# Patient Record
Sex: FEMALE | Race: WHITE | NOT HISPANIC OR LATINO | Employment: FULL TIME | URBAN - METROPOLITAN AREA
[De-identification: names, ages, dates, MRNs, and addresses within clinical notes are randomized per-mention and may not be internally consistent; named-entity substitution may affect disease eponyms.]

---

## 2018-04-25 ENCOUNTER — HISTORICAL (OUTPATIENT)
Dept: ADMINISTRATIVE | Facility: HOSPITAL | Age: 33
End: 2018-04-25

## 2018-04-25 LAB
ABS NEUT (OLG): 4.56 X10(3)/MCL (ref 2.1–9.2)
ALBUMIN SERPL-MCNC: 4 GM/DL (ref 3.4–5)
ALBUMIN/GLOB SERPL: 1 RATIO (ref 1–2)
ALP SERPL-CCNC: 42 UNIT/L (ref 45–117)
ALT SERPL-CCNC: 18 UNIT/L (ref 12–78)
APPEARANCE, UA: CLEAR
AST SERPL-CCNC: 13 UNIT/L (ref 15–37)
BACTERIA #/AREA URNS AUTO: ABNORMAL /[HPF]
BASOPHILS # BLD AUTO: 0.07 X10(3)/MCL
BASOPHILS NFR BLD AUTO: 1 %
BILIRUB SERPL-MCNC: 0.7 MG/DL (ref 0.2–1)
BILIRUB UR QL STRIP: NEGATIVE
BILIRUBIN DIRECT+TOT PNL SERPL-MCNC: 0.2 MG/DL
BILIRUBIN DIRECT+TOT PNL SERPL-MCNC: 0.5 MG/DL
BUN SERPL-MCNC: 17 MG/DL (ref 7–18)
CALCIUM SERPL-MCNC: 8.6 MG/DL (ref 8.5–10.1)
CHLORIDE SERPL-SCNC: 108 MMOL/L (ref 98–107)
CO2 SERPL-SCNC: 27 MMOL/L (ref 21–32)
COLOR UR: NORMAL
CREAT SERPL-MCNC: 0.9 MG/DL (ref 0.6–1.3)
EOSINOPHIL # BLD AUTO: 0.1 X10(3)/MCL
EOSINOPHIL NFR BLD AUTO: 1 %
ERYTHROCYTE [DISTWIDTH] IN BLOOD BY AUTOMATED COUNT: 13.3 % (ref 11.5–14.5)
GLOBULIN SER-MCNC: 3.4 GM/ML (ref 2.3–3.5)
GLUCOSE (UA): NORMAL
GLUCOSE SERPL-MCNC: 65 MG/DL (ref 74–106)
HCT VFR BLD AUTO: 41.3 % (ref 35–46)
HGB BLD-MCNC: 13.6 GM/DL (ref 12–16)
HGB UR QL STRIP: NEGATIVE
HYALINE CASTS #/AREA URNS LPF: ABNORMAL /[LPF]
IMM GRANULOCYTES # BLD AUTO: 0.02 10*3/UL
IMM GRANULOCYTES NFR BLD AUTO: 0 %
KETONES UR QL STRIP: NEGATIVE
LEUKOCYTE ESTERASE UR QL STRIP: NEGATIVE
LYMPHOCYTES # BLD AUTO: 1.99 X10(3)/MCL
LYMPHOCYTES NFR BLD AUTO: 27 % (ref 13–40)
MCH RBC QN AUTO: 29.9 PG (ref 26–34)
MCHC RBC AUTO-ENTMCNC: 32.9 GM/DL (ref 31–37)
MCV RBC AUTO: 90.8 FL (ref 80–100)
MONOCYTES # BLD AUTO: 0.51 X10(3)/MCL
MONOCYTES NFR BLD AUTO: 7 % (ref 4–12)
NEUTROPHILS # BLD AUTO: 4.56 X10(3)/MCL
NEUTROPHILS NFR BLD AUTO: 63 X10(3)/MCL
NITRITE UR QL STRIP: NEGATIVE
PH UR STRIP: 6 [PH] (ref 4.5–8)
PLATELET # BLD AUTO: 241 X10(3)/MCL (ref 130–400)
PMV BLD AUTO: 10.6 FL (ref 7.4–10.4)
POTASSIUM SERPL-SCNC: 4.2 MMOL/L (ref 3.5–5.1)
PROT SERPL-MCNC: 7.4 GM/DL (ref 6.4–8.2)
PROT UR QL STRIP: NEGATIVE
RBC # BLD AUTO: 4.55 X10(6)/MCL (ref 4–5.2)
RBC #/AREA URNS AUTO: ABNORMAL /[HPF]
SODIUM SERPL-SCNC: 141 MMOL/L (ref 136–145)
SP GR UR STRIP: 1.01 (ref 1–1.03)
SQUAMOUS #/AREA URNS LPF: ABNORMAL /[LPF]
UROBILINOGEN UR STRIP-ACNC: NORMAL
WBC # SPEC AUTO: 7.2 X10(3)/MCL (ref 4.5–11)
WBC #/AREA URNS AUTO: ABNORMAL /HPF

## 2019-01-16 ENCOUNTER — HISTORICAL (OUTPATIENT)
Dept: ADMINISTRATIVE | Facility: HOSPITAL | Age: 34
End: 2019-01-16

## 2019-01-16 LAB
HAV IGM SERPL QL IA: NONREACTIVE
HBV CORE IGM SERPL QL IA: NONREACTIVE
HBV SURFACE AG SERPL QL IA: NEGATIVE
HCV AB SERPL QL IA: NONREACTIVE
HIV 1+2 AB+HIV1 P24 AG SERPL QL IA: NONREACTIVE
T PALLIDUM AB SER QL: NONREACTIVE

## 2019-04-30 ENCOUNTER — HISTORICAL (OUTPATIENT)
Dept: INTERNAL MEDICINE | Facility: CLINIC | Age: 34
End: 2019-04-30

## 2019-04-30 LAB
ABS NEUT (OLG): 2.79 X10(3)/MCL (ref 2.1–9.2)
ALBUMIN SERPL-MCNC: 3.8 GM/DL (ref 3.4–5)
ALBUMIN/GLOB SERPL: 1.1 RATIO (ref 1.1–2)
ALP SERPL-CCNC: 35 UNIT/L (ref 45–117)
ALT SERPL-CCNC: 13 UNIT/L (ref 12–78)
AST SERPL-CCNC: 12 UNIT/L (ref 15–37)
BASOPHILS # BLD AUTO: 0.06 X10(3)/MCL
BASOPHILS NFR BLD AUTO: 1 %
BILIRUB SERPL-MCNC: 0.8 MG/DL (ref 0.2–1)
BILIRUBIN DIRECT+TOT PNL SERPL-MCNC: 0.2 MG/DL
BILIRUBIN DIRECT+TOT PNL SERPL-MCNC: 0.6 MG/DL
BUN SERPL-MCNC: 14 MG/DL (ref 7–18)
CALCIUM SERPL-MCNC: 8.5 MG/DL (ref 8.5–10.1)
CHLORIDE SERPL-SCNC: 109 MMOL/L (ref 98–107)
CHOLEST SERPL-MCNC: 152 MG/DL
CHOLEST/HDLC SERPL: 2.5 {RATIO} (ref 0–4.4)
CO2 SERPL-SCNC: 30 MMOL/L (ref 21–32)
CREAT SERPL-MCNC: 0.9 MG/DL (ref 0.6–1.3)
EOSINOPHIL # BLD AUTO: 0.15 10*3/UL
EOSINOPHIL NFR BLD AUTO: 2 %
ERYTHROCYTE [DISTWIDTH] IN BLOOD BY AUTOMATED COUNT: 13 % (ref 11.5–14.5)
GLOBULIN SER-MCNC: 3.6 GM/ML (ref 2.3–3.5)
GLUCOSE SERPL-MCNC: 88 MG/DL (ref 74–106)
HCT VFR BLD AUTO: 41.3 % (ref 35–46)
HDLC SERPL-MCNC: 62 MG/DL
HGB BLD-MCNC: 13.3 GM/DL (ref 12–16)
IMM GRANULOCYTES # BLD AUTO: 0.02 10*3/UL
IMM GRANULOCYTES NFR BLD AUTO: 0 %
LDLC SERPL CALC-MCNC: 75 MG/DL (ref 0–130)
LYMPHOCYTES # BLD AUTO: 2.96 X10(3)/MCL
LYMPHOCYTES NFR BLD AUTO: 46 % (ref 13–40)
MCH RBC QN AUTO: 29.4 PG (ref 26–34)
MCHC RBC AUTO-ENTMCNC: 32.2 GM/DL (ref 31–37)
MCV RBC AUTO: 91.4 FL (ref 80–100)
MONOCYTES # BLD AUTO: 0.43 X10(3)/MCL
MONOCYTES NFR BLD AUTO: 7 % (ref 4–12)
NEUTROPHILS # BLD AUTO: 2.79 X10(3)/MCL
NEUTROPHILS NFR BLD AUTO: 44 X10(3)/MCL
PLATELET # BLD AUTO: 239 X10(3)/MCL (ref 130–400)
PMV BLD AUTO: 10.7 FL (ref 7.4–10.4)
POTASSIUM SERPL-SCNC: 4.1 MMOL/L (ref 3.5–5.1)
PROT SERPL-MCNC: 7.4 GM/DL (ref 6.4–8.2)
RBC # BLD AUTO: 4.52 X10(6)/MCL (ref 4–5.2)
SODIUM SERPL-SCNC: 142 MMOL/L (ref 136–145)
TRIGL SERPL-MCNC: 73 MG/DL
VLDLC SERPL CALC-MCNC: 15 MG/DL
WBC # SPEC AUTO: 6.4 X10(3)/MCL (ref 4.5–11)

## 2021-08-16 LAB
PAP RECOMMENDATION EXT: NORMAL
PAP RECOMMENDATION EXT: NORMAL
PAP SMEAR: NORMAL
PAP SMEAR: NORMAL

## 2022-04-13 ENCOUNTER — HISTORICAL (OUTPATIENT)
Dept: ADMINISTRATIVE | Facility: HOSPITAL | Age: 37
End: 2022-04-13

## 2022-04-13 LAB
CRP SERPL HS-MCNC: <0.03 MG/L
ERYTHROCYTE [SEDIMENTATION RATE] IN BLOOD: 2 MM/H (ref 0–20)
HEMOLYSIS INTERF INDEX SERPL-ACNC: 8
MAGNESIUM SERPL-MCNC: 2.3 MG/DL (ref 1.6–2.6)

## 2022-04-20 ENCOUNTER — HISTORICAL (OUTPATIENT)
Dept: ADMINISTRATIVE | Facility: HOSPITAL | Age: 37
End: 2022-04-20
Payer: COMMERCIAL

## 2022-04-30 NOTE — PROGRESS NOTES
Patient:   Teodora Santos            MRN: [id number]            FIN: [id number]               Age:   34 years     Sex:  Female     :  1985   Associated Diagnoses:   None   Author:   Jhoan WATTS, Madeleine Rogers reviewed: Will discuss with patient during follow up appointment on  May 2, 2019 at 8:40am.

## 2023-01-05 ENCOUNTER — DOCUMENTATION ONLY (OUTPATIENT)
Dept: INTERNAL MEDICINE | Facility: CLINIC | Age: 38
End: 2023-01-05

## 2023-01-31 ENCOUNTER — TELEPHONE (OUTPATIENT)
Dept: NEUROLOGY | Facility: CLINIC | Age: 38
End: 2023-01-31

## 2023-01-31 NOTE — TELEPHONE ENCOUNTER
Patient called requesting if we can send a new prescription for Nerivio medication. States she spoke with [facility] and was advised the prescription was . Please advise.

## 2023-01-31 NOTE — TELEPHONE ENCOUNTER
Pt was last seen 1/18/2022 and does not currently have a follow up. She would need an appointment to be revaluated to be able to send order for Rx

## 2023-02-03 NOTE — TELEPHONE ENCOUNTER
Spoke with patient, states she had a changed in her insurance. States she now has [employer] medicaid insurance. Is it okay to schedule patient a f/u appt then refer pt out afterwards? Please advise.

## 2023-02-03 NOTE — TELEPHONE ENCOUNTER
We would still need to provider care for her until she is able to be seen by possibly [facility] would need to check when next opening is for a transfer and if that time frame is okay for her

## 2023-02-23 ENCOUNTER — OFFICE VISIT (OUTPATIENT)
Dept: NEUROLOGY | Facility: CLINIC | Age: 38
End: 2023-02-23
Payer: MEDICAID

## 2023-02-23 VITALS
HEART RATE: 72 BPM | DIASTOLIC BLOOD PRESSURE: 66 MMHG | WEIGHT: 154 LBS | BODY MASS INDEX: 23.34 KG/M2 | SYSTOLIC BLOOD PRESSURE: 98 MMHG | HEIGHT: 68 IN

## 2023-02-23 DIAGNOSIS — G44.209 TENSION HEADACHE: ICD-10-CM

## 2023-02-23 DIAGNOSIS — G43.909 MIGRAINE WITHOUT STATUS MIGRAINOSUS, NOT INTRACTABLE, UNSPECIFIED MIGRAINE TYPE: Primary | ICD-10-CM

## 2023-02-23 PROCEDURE — 3078F PR MOST RECENT DIASTOLIC BLOOD PRESSURE < 80 MM HG: ICD-10-PCS | Mod: CPTII,,, | Performed by: NURSE PRACTITIONER

## 2023-02-23 PROCEDURE — 3008F PR BODY MASS INDEX (BMI) DOCUMENTED: ICD-10-PCS | Mod: CPTII,,, | Performed by: NURSE PRACTITIONER

## 2023-02-23 PROCEDURE — 1160F PR REVIEW ALL MEDS BY PRESCRIBER/CLIN PHARMACIST DOCUMENTED: ICD-10-PCS | Mod: CPTII,,, | Performed by: NURSE PRACTITIONER

## 2023-02-23 PROCEDURE — 99214 OFFICE O/P EST MOD 30 MIN: CPT | Mod: S$PBB,,, | Performed by: NURSE PRACTITIONER

## 2023-02-23 PROCEDURE — 3078F DIAST BP <80 MM HG: CPT | Mod: CPTII,,, | Performed by: NURSE PRACTITIONER

## 2023-02-23 PROCEDURE — 3074F SYST BP LT 130 MM HG: CPT | Mod: CPTII,,, | Performed by: NURSE PRACTITIONER

## 2023-02-23 PROCEDURE — 3008F BODY MASS INDEX DOCD: CPT | Mod: CPTII,,, | Performed by: NURSE PRACTITIONER

## 2023-02-23 PROCEDURE — 99999 PR PBB SHADOW E&M-EST. PATIENT-LVL IV: ICD-10-PCS | Mod: PBBFAC,,, | Performed by: NURSE PRACTITIONER

## 2023-02-23 PROCEDURE — 1159F PR MEDICATION LIST DOCUMENTED IN MEDICAL RECORD: ICD-10-PCS | Mod: CPTII,,, | Performed by: NURSE PRACTITIONER

## 2023-02-23 PROCEDURE — 1159F MED LIST DOCD IN RCRD: CPT | Mod: CPTII,,, | Performed by: NURSE PRACTITIONER

## 2023-02-23 PROCEDURE — 1160F RVW MEDS BY RX/DR IN RCRD: CPT | Mod: CPTII,,, | Performed by: NURSE PRACTITIONER

## 2023-02-23 PROCEDURE — 99214 PR OFFICE/OUTPT VISIT, EST, LEVL IV, 30-39 MIN: ICD-10-PCS | Mod: S$PBB,,, | Performed by: NURSE PRACTITIONER

## 2023-02-23 PROCEDURE — 3074F PR MOST RECENT SYSTOLIC BLOOD PRESSURE < 130 MM HG: ICD-10-PCS | Mod: CPTII,,, | Performed by: NURSE PRACTITIONER

## 2023-02-23 PROCEDURE — 99214 OFFICE O/P EST MOD 30 MIN: CPT | Mod: PBBFAC | Performed by: NURSE PRACTITIONER

## 2023-02-23 PROCEDURE — 99999 PR PBB SHADOW E&M-EST. PATIENT-LVL IV: CPT | Mod: PBBFAC,,, | Performed by: NURSE PRACTITIONER

## 2023-02-23 RX ORDER — CYCLOBENZAPRINE HCL 5 MG
5 TABLET ORAL 3 TIMES DAILY PRN
Qty: 15 TABLET | Refills: 1 | Status: SHIPPED | OUTPATIENT
Start: 2023-02-23 | End: 2023-03-06 | Stop reason: SDUPTHER

## 2023-02-23 RX ORDER — ESCITALOPRAM OXALATE 10 MG/1
10 TABLET ORAL DAILY
COMMUNITY
Start: 2023-01-09

## 2023-02-23 RX ORDER — KETOROLAC TROMETHAMINE 10 MG/1
10 TABLET, FILM COATED ORAL EVERY 6 HOURS PRN
Qty: 12 TABLET | Refills: 0 | Status: SHIPPED | OUTPATIENT
Start: 2023-02-23 | End: 2023-03-06 | Stop reason: SDUPTHER

## 2023-02-23 RX ORDER — ALPRAZOLAM 0.5 MG/1
1 TABLET ORAL
COMMUNITY
Start: 2021-06-01

## 2023-02-23 RX ORDER — IBUPROFEN 200 MG
200 TABLET ORAL EVERY 6 HOURS PRN
COMMUNITY

## 2023-02-23 RX ORDER — SUMATRIPTAN 50 MG/1
TABLET, FILM COATED ORAL
Qty: 9 TABLET | Refills: 5 | Status: SHIPPED | OUTPATIENT
Start: 2023-02-23 | End: 2023-03-06 | Stop reason: SDUPTHER

## 2023-02-23 RX ORDER — ONDANSETRON HYDROCHLORIDE 8 MG/1
8 TABLET, FILM COATED ORAL EVERY 6 HOURS PRN
Qty: 12 TABLET | Refills: 0 | Status: SHIPPED | OUTPATIENT
Start: 2023-02-23 | End: 2023-03-06 | Stop reason: SDUPTHER

## 2023-02-23 NOTE — ASSESSMENT & PLAN NOTE
-Migraine cocktail and imitrex prn.  Discussed low likelihood of serotonin syndrome taking imitrex sparingly  -Mag, B2, and probiotic

## 2023-02-23 NOTE — PROGRESS NOTES
Subjective:       Patient ID: Teodora Santos is a 38 y.o. female.    Chief Complaint: Migraine (Migraines are about the same. Migraines are 6 - 8 a month to left/right parietal area. Sometimes migraines will start in her shoulder. Migraines usually lasts for 3 days. Also, has tension headaches due to stress. Tension headaches are once a week all around head, lasting for a day. Some nausea with migraines. Migraines are sensitive to light and sound.)      History of Present Illness:  FU visit for migraine.  Recall, patient is very conservative in regards to medications so she is not currently on any headache blocking meds.  She was started on Aimovig, but never started it in fear of side effects or medication interactions.  She was also given Nurtec samples in the past, but never took them.  She still has the samples.  She has about 6 8 migraines a month that occur in the left and right parietal region.  She also has tension headaches that occur once a week to the crown of her head.  She thinks this is from being on the computer a lot.  She knows that the lights from the computer can also trigger her migraines.  Her menstrual cycle can also be a trigger for her migraines.  She takes ibuprofen as needed when she gets a bad headache, but wonders if there is something stronger that she could take because that is not always effective for her.  When she has a migraine, she has associated phonophobia and nausea.  Her main concern regarding medication is a possibility of serotonin syndrome.      Review of Systems  I have reviewed a 12 point review of systems which is negative unless otherwise stated in HPI        Past Medical History:   Diagnosis Date    Anxiety disorder, unspecified     Hypoglycemia, unspecified     Migraine     OCD (obsessive compulsive disorder)        Past Surgical History:   Procedure Laterality Date    Closure of skin laceration by suture      TONSILLECTOMY      WISDOM TOOTH EXTRACTION       "    Family History   Problem Relation Age of Onset    No Known Problems Mother     Hypertension Father     Anxiety disorder Sister         Social History     Socioeconomic History    Marital status: Significant Other   Tobacco Use    Smoking status: Former     Types: Cigarettes    Smokeless tobacco: Never   Substance and Sexual Activity    Alcohol use: Yes     Alcohol/week: 1.0 standard drink     Types: 1 Cans of beer per week     Comment: Daily    Drug use: Never        Outpatient Encounter Medications as of 2/23/2023   Medication Sig Dispense Refill    ALPRAZolam (XANAX) 0.5 MG tablet Take 1 tablet by mouth as needed.      EScitalopram oxalate (LEXAPRO) 10 MG tablet Take 10 mg by mouth Daily.      ibuprofen (ADVIL,MOTRIN) 200 MG tablet Take 200 mg by mouth every 6 (six) hours as needed for Pain.      tretinoin (ALTRENO TOP) Apply 1 % topically as needed.      UNABLE TO FIND Place onto the skin as needed. Nerivio      UNABLE TO FIND Take 1 Scoop by mouth Daily. Beam supplement      cyclobenzaprine (FLEXERIL) 5 MG tablet Take 1 tablet (5 mg total) by mouth 3 (three) times daily as needed for Muscle spasms. 15 tablet 1    ketorolac (TORADOL) 10 mg tablet Take 1 tablet (10 mg total) by mouth every 6 (six) hours as needed for Pain (migraine). Migraine cocktail to take with zofran and benadryl 12 tablet 0    ondansetron (ZOFRAN) 8 MG tablet Take 1 tablet (8 mg total) by mouth every 6 (six) hours as needed for Nausea (migraine). Migraine cocktail to be taken with ketorolac and benadryl 12 tablet 0    sumatriptan (IMITREX) 50 MG tablet Take 1 tablet prn once for migraine, may repeat dose in 2 hours 9 tablet 5     No facility-administered encounter medications on file as of 2/23/2023.      Objective:   BP 98/66 (BP Location: Right arm)   Pulse 72   Ht 5' 8" (1.727 m)   Wt 69.9 kg (154 lb)   BMI 23.42 kg/m²        Physical Exam  NAD  alert and oriented  cognition and perception intact  no aphasia  EOMI  no facial " asymmetry  no dysarthria  moves all extremities symmetrically  no gross coordination abnormalities  gait normal       Assessment & Plan:      1. Migraine without status migrainosus, not intractable, unspecified migraine type  Assessment & Plan:  -Migraine cocktail and imitrex prn.  Discussed low likelihood of serotonin syndrome taking imitrex sparingly  -Mag, B2, and probiotic    Orders:  -     sumatriptan (IMITREX) 50 MG tablet; Take 1 tablet prn once for migraine, may repeat dose in 2 hours  Dispense: 9 tablet; Refill: 5  -     ketorolac (TORADOL) 10 mg tablet; Take 1 tablet (10 mg total) by mouth every 6 (six) hours as needed for Pain (migraine). Migraine cocktail to take with zofran and benadryl  Dispense: 12 tablet; Refill: 0  -     ondansetron (ZOFRAN) 8 MG tablet; Take 1 tablet (8 mg total) by mouth every 6 (six) hours as needed for Nausea (migraine). Migraine cocktail to be taken with ketorolac and benadryl  Dispense: 12 tablet; Refill: 0  -     Ambulatory referral/consult to Neurology; Future; Expected date: 03/02/2023    2. Tension headache  Assessment & Plan:  -flexeril prn.  Don't take with xanax    Orders:  -     cyclobenzaprine (FLEXERIL) 5 MG tablet; Take 1 tablet (5 mg total) by mouth 3 (three) times daily as needed for Muscle spasms.  Dispense: 15 tablet; Refill: 1  -     Ambulatory referral/consult to Neurology; Future; Expected date: 03/02/2023          This note was created with the assistance of voice recognition software. There may be transcription errors as a result of using this technology however minimal. Effort has been made to assure accuracy of transcription but any obvious errors or omissions should be clarified with the author of the document.

## 2023-03-05 ENCOUNTER — PATIENT MESSAGE (OUTPATIENT)
Dept: NEUROLOGY | Facility: CLINIC | Age: 38
End: 2023-03-05
Payer: MEDICAID

## 2023-03-06 DIAGNOSIS — G44.209 TENSION HEADACHE: ICD-10-CM

## 2023-03-06 DIAGNOSIS — G43.909 MIGRAINE WITHOUT STATUS MIGRAINOSUS, NOT INTRACTABLE, UNSPECIFIED MIGRAINE TYPE: ICD-10-CM

## 2023-03-06 RX ORDER — ONDANSETRON HYDROCHLORIDE 8 MG/1
8 TABLET, FILM COATED ORAL EVERY 6 HOURS PRN
Qty: 12 TABLET | Refills: 0 | Status: SHIPPED | OUTPATIENT
Start: 2023-03-06

## 2023-03-06 RX ORDER — SUMATRIPTAN 50 MG/1
TABLET, FILM COATED ORAL
Qty: 9 TABLET | Refills: 5 | Status: SHIPPED | OUTPATIENT
Start: 2023-03-06

## 2023-03-06 RX ORDER — CYCLOBENZAPRINE HCL 5 MG
5 TABLET ORAL 3 TIMES DAILY PRN
Qty: 15 TABLET | Refills: 1 | Status: SHIPPED | OUTPATIENT
Start: 2023-03-06 | End: 2023-08-24

## 2023-03-06 RX ORDER — KETOROLAC TROMETHAMINE 10 MG/1
10 TABLET, FILM COATED ORAL EVERY 6 HOURS PRN
Qty: 12 TABLET | Refills: 0 | Status: SHIPPED | OUTPATIENT
Start: 2023-03-06

## 2023-04-06 ENCOUNTER — PATIENT MESSAGE (OUTPATIENT)
Dept: NEUROLOGY | Facility: CLINIC | Age: 38
End: 2023-04-06
Payer: MEDICAID

## 2023-04-10 NOTE — TELEPHONE ENCOUNTER
I don't know anything about prior auths for that device.  She may need to call her insurance and have them fax us a prior auth request.  Please call her to discuss

## 2023-05-01 ENCOUNTER — TELEPHONE (OUTPATIENT)
Dept: NEUROLOGY | Facility: CLINIC | Age: 38
End: 2023-05-01
Payer: MEDICAID

## 2023-05-01 NOTE — TELEPHONE ENCOUNTER
Pt called reporting she s/w her insurance regarding them sending a request for PA to our office for coverage of Nerivio device.  Pt was advised by her insurance that we would need to contact them directly.  Pt provided insurance ph # [phone]     CB# [phone]

## 2023-05-02 NOTE — TELEPHONE ENCOUNTER
Message left on VM from patient stating we have to contact pharmacy regarding paperwork for Nerivio device.

## 2023-07-05 ENCOUNTER — PATIENT MESSAGE (OUTPATIENT)
Dept: NEUROLOGY | Facility: CLINIC | Age: 38
End: 2023-07-05
Payer: MEDICAID

## 2023-07-05 DIAGNOSIS — G43.909 MIGRAINE WITHOUT STATUS MIGRAINOSUS, NOT INTRACTABLE, UNSPECIFIED MIGRAINE TYPE: Primary | ICD-10-CM

## 2023-07-05 RX ORDER — RIMEGEPANT SULFATE 75 MG/75MG
75 TABLET, ORALLY DISINTEGRATING ORAL ONCE AS NEEDED
Qty: 8 TABLET | Refills: 5 | Status: SHIPPED | OUTPATIENT
Start: 2023-07-05 | End: 2023-07-05

## 2023-07-05 NOTE — TELEPHONE ENCOUNTER
Please send in nurtec for her.  For mentrual migraines, she can take the nurtec every other day during her cycle.  Start it about 2 days before starting cycle

## 2023-08-09 ENCOUNTER — PATIENT MESSAGE (OUTPATIENT)
Dept: NEUROLOGY | Facility: CLINIC | Age: 38
End: 2023-08-09
Payer: MEDICAID

## 2023-08-24 ENCOUNTER — HOSPITAL ENCOUNTER (OUTPATIENT)
Dept: RADIOLOGY | Facility: HOSPITAL | Age: 38
Discharge: HOME | End: 2023-08-24
Attending: NURSE PRACTITIONER
Payer: MEDICAID

## 2023-08-24 ENCOUNTER — OFFICE VISIT (OUTPATIENT)
Dept: INTERNAL MEDICINE | Facility: CLINIC | Age: 38
End: 2023-08-24
Payer: MEDICAID

## 2023-08-24 VITALS
RESPIRATION RATE: 18 BRPM | BODY MASS INDEX: 23.29 KG/M2 | WEIGHT: 153.63 LBS | DIASTOLIC BLOOD PRESSURE: 68 MMHG | SYSTOLIC BLOOD PRESSURE: 102 MMHG | HEIGHT: 68 IN | TEMPERATURE: 99 F | HEART RATE: 76 BPM

## 2023-08-24 DIAGNOSIS — H92.02 LEFT EAR PAIN: ICD-10-CM

## 2023-08-24 DIAGNOSIS — Z00.00 WELLNESS EXAMINATION: ICD-10-CM

## 2023-08-24 DIAGNOSIS — M79.674 PAIN IN TOE OF RIGHT FOOT: Primary | ICD-10-CM

## 2023-08-24 DIAGNOSIS — G43.909 MIGRAINE WITHOUT STATUS MIGRAINOSUS, NOT INTRACTABLE, UNSPECIFIED MIGRAINE TYPE: Chronic | ICD-10-CM

## 2023-08-24 DIAGNOSIS — M79.674 PAIN IN TOE OF RIGHT FOOT: ICD-10-CM

## 2023-08-24 PROCEDURE — 1159F MED LIST DOCD IN RCRD: CPT | Mod: CPTII,,, | Performed by: NURSE PRACTITIONER

## 2023-08-24 PROCEDURE — 1160F PR REVIEW ALL MEDS BY PRESCRIBER/CLIN PHARMACIST DOCUMENTED: ICD-10-PCS | Mod: CPTII,,, | Performed by: NURSE PRACTITIONER

## 2023-08-24 PROCEDURE — 3078F DIAST BP <80 MM HG: CPT | Mod: CPTII,,, | Performed by: NURSE PRACTITIONER

## 2023-08-24 PROCEDURE — 1160F RVW MEDS BY RX/DR IN RCRD: CPT | Mod: CPTII,,, | Performed by: NURSE PRACTITIONER

## 2023-08-24 PROCEDURE — 3074F PR MOST RECENT SYSTOLIC BLOOD PRESSURE < 130 MM HG: ICD-10-PCS | Mod: CPTII,,, | Performed by: NURSE PRACTITIONER

## 2023-08-24 PROCEDURE — 1159F PR MEDICATION LIST DOCUMENTED IN MEDICAL RECORD: ICD-10-PCS | Mod: CPTII,,, | Performed by: NURSE PRACTITIONER

## 2023-08-24 PROCEDURE — 73630 X-RAY EXAM OF FOOT: CPT | Mod: TC,RT

## 2023-08-24 PROCEDURE — 3078F PR MOST RECENT DIASTOLIC BLOOD PRESSURE < 80 MM HG: ICD-10-PCS | Mod: CPTII,,, | Performed by: NURSE PRACTITIONER

## 2023-08-24 PROCEDURE — 99204 PR OFFICE/OUTPT VISIT, NEW, LEVL IV, 45-59 MIN: ICD-10-PCS | Mod: S$PBB,,, | Performed by: NURSE PRACTITIONER

## 2023-08-24 PROCEDURE — 3008F PR BODY MASS INDEX (BMI) DOCUMENTED: ICD-10-PCS | Mod: CPTII,,, | Performed by: NURSE PRACTITIONER

## 2023-08-24 PROCEDURE — 99215 OFFICE O/P EST HI 40 MIN: CPT | Mod: PBBFAC | Performed by: NURSE PRACTITIONER

## 2023-08-24 PROCEDURE — 99204 OFFICE O/P NEW MOD 45 MIN: CPT | Mod: S$PBB,,, | Performed by: NURSE PRACTITIONER

## 2023-08-24 PROCEDURE — 3074F SYST BP LT 130 MM HG: CPT | Mod: CPTII,,, | Performed by: NURSE PRACTITIONER

## 2023-08-24 PROCEDURE — 3008F BODY MASS INDEX DOCD: CPT | Mod: CPTII,,, | Performed by: NURSE PRACTITIONER

## 2023-08-24 NOTE — PROGRESS NOTES
Patient ID: [id number]     Chief Complaint: Establish Care, Toe Pain, and Ear Fullness (Toe pain since Feb. Request x ray. 2. Left ear pressure at times , decreased hearing . )    HPI:     Teodora Santos is a 38 y.o. female with diagnosis of Migraine. Patient seen in clinic today to establish care.   Today, patient states she had an injury to her 3rd toe on right states hit the corner of a table.  Patient states had a virtual visit, was instructed to tape injured toe to side toe and rest.  Patient states still has pain.  Patient states injury occurred in 2023.   Patient also states left ear at times.  States her boyfriend is in a band and she does wear ear when she goes to listen to him.  Patient denies ear drainage, states does have slight decrease in to the ear at times.   Patient denies any other acute complaints.    Patient followed by Neurology. Last appointment on 2023. Migraine without status migrainosus, not intractable, unspecified migraine type: Migraine cocktail and imitrex prn. Discussed low likelihood of serotonin syndrome taking imitrex sparingly. Mag, B2, and probiotic. Tension headache: flexeril prn. Don't take with xanax. Patient has follow up appointment scheduled for 10/10/2023.     Review of patient's allergies indicates:  No Known Allergies    Breast Cancer Screening:  deferred due to age  Cervical Cancer Screenin2022, followed by Dr. Caballero  Colorectal Cancer Screening: deferred due to age  Diabetic Eye Exam: N/A  Diabetic Foot Exam: N/A  Lung Cancer Screening: N/A  Prostate Cancer Screening: N/A  AAA Screening: N/A  Osteoporosis Screening: N/A  Medicare Wellness: N/A  Immunizations:   There is no immunization history on file for this patient.    Past Surgical History:   Procedure Laterality Date    Closure of skin laceration by suture      TONSILLECTOMY      WISDOM TOOTH EXTRACTION       family history includes Anxiety disorder in her mother and sister; Colon cancer in her  paternal grandmother; Hypertension in her father; Skin cancer in her paternal grandfather.    Social History     Socioeconomic History    Marital status: Significant Other   Tobacco Use    Smoking status: Former     Current packs/day: 0.00     Types: Cigarettes    Smokeless tobacco: Never   Substance and Sexual Activity    Alcohol use: Yes     Alcohol/week: 1.0 standard drink of alcohol     Types: 1 Cans of beer per week     Comment: 3-4 nights a week  beer    Drug use: Never    Sexual activity: Yes     Partners: Male     Birth control/protection: Condom     Current Outpatient Medications   Medication Instructions    ALPRAZolam (XANAX) 0.5 MG tablet 1 tablet, Oral, As needed (PRN)    digital therapeutic,INGRID device (NERIVIO DIGITAL ADEN, MIGRAINE,) Misc 1 kit, Apply Externally, Daily PRN    EScitalopram oxalate (LEXAPRO) 10 mg, Oral, Daily    ibuprofen (ADVIL,MOTRIN) 200 mg, Oral, Every 6 hours PRN    ketorolac (TORADOL) 10 mg, Oral, Every 6 hours PRN, Migraine cocktail to take with zofran and benadryl    miscellaneous medical supply Kit 1 application , Other, Daily PRN, Nerivio device    ondansetron (ZOFRAN) 8 mg, Oral, Every 6 hours PRN, Migraine cocktail to be taken with ketorolac and benadryl    sumatriptan (IMITREX) 50 MG tablet Take 1 tablet prn once for migraine, may repeat dose in 2 hours    tretinoin (ALTRENO TOP) 1 %, Topical (Top), As needed (PRN)    UNABLE TO FIND Transdermal, As needed (PRN), Nerivio    UNABLE TO FIND 1 Scoop, Oral, Daily, Beam supplement       Subjective:     Review of Systems   Constitutional: Negative.    HENT:  Positive for ear pain.    Eyes: Negative.    Respiratory: Negative.     Cardiovascular: Negative.    Gastrointestinal: Negative.    Endocrine: Negative.    Genitourinary: Negative.    Musculoskeletal:  Positive for arthralgias.   Skin: Negative.    Allergic/Immunologic: Negative.    Neurological: Negative.    Hematological: Negative.    Psychiatric/Behavioral: Negative.    "      Objective:     Visit Vitals  /68 (BP Location: Left arm, Patient Position: Sitting, BP Method: Medium (Automatic))   Pulse 76   Temp 98.6 °F (37 °C) (Oral)   Resp 18   Ht 5' 7.99" (1.727 m)   Wt 69.7 kg (153 lb 9.6 oz)   LMP 08/14/2023 (Approximate)   BMI 23.36 kg/m²       Physical Exam  Vitals reviewed.   Constitutional:       Appearance: Normal appearance.   HENT:      Head: Normocephalic and atraumatic.      Right Ear: Tympanic membrane normal. There is no impacted cerumen.      Left Ear: Tympanic membrane normal. There is no impacted cerumen.      Mouth/Throat:      Mouth: Mucous membranes are moist.      Pharynx: Oropharynx is clear.   Eyes:      Extraocular Movements: Extraocular movements intact.      Conjunctiva/sclera: Conjunctivae normal.      Pupils: Pupils are equal, round, and reactive to light.   Cardiovascular:      Rate and Rhythm: Normal rate and regular rhythm.      Heart sounds: Normal heart sounds.   Pulmonary:      Effort: Pulmonary effort is normal.      Breath sounds: Normal breath sounds.   Abdominal:      General: Bowel sounds are normal.   Musculoskeletal:         General: Normal range of motion.      Cervical back: Normal range of motion.   Skin:     General: Skin is warm and dry.   Neurological:      Mental Status: She is alert and oriented to person, place, and time.   Psychiatric:         Mood and Affect: Mood normal.         Behavior: Behavior normal.       Labs Reviewed:     Hematology:  Lab Results   Component Value Date    WBC 6.4 04/30/2019    HGB 13.3 04/30/2019    HCT 41.3 04/30/2019     04/30/2019     Chemistry:  Lab Results   Component Value Date     04/30/2019    K 4.1 04/30/2019    CHLORIDE 109 (H) 04/30/2019    BUN 14 04/30/2019    CREATININE 0.90 04/30/2019    GLUCOSE 88 04/30/2019    CALCIUM 8.5 04/30/2019    ALKPHOS 35 (L) 04/30/2019    LABPROT 7.4 04/30/2019    ALBUMIN 3.8 04/30/2019    BILIDIR 0.2 04/30/2019    IBILI 0.6 04/30/2019    AST 12 (L) " "04/30/2019    ALT 13 04/30/2019    MG 2.30 04/13/2022     No results found for: "HGBA1C", "MICROALBCREA"     Lipid Panel:  Lab Results   Component Value Date    CHOL 152 04/30/2019    HDL 62 04/30/2019    LDL 75 04/30/2019    TRIG 73 04/30/2019    TOTALCHOLEST 2.5 04/30/2019     Thyroid:  No results found for: "TSH", "T4FREE", "T3FREE"     Urine:  Lab Results   Component Value Date    APPEARANCEUA Clear 04/25/2018    PROTEINUA Negative 04/25/2018    LEUKOCYTESUR Negative 04/25/2018    RBCUA 0-2 04/25/2018    WBCUA 0-2 04/25/2018    BACTERIA None Seen 04/25/2018    SQEPUA 2-20 04/25/2018    HYALINECASTS 0-2 (A) 04/25/2018        Assessment:       ICD-10-CM ICD-9-CM   1. Pain in toe of right foot  M79.674 729.5   2. Left ear pain  H92.02 388.70   3. Migraine without status migrainosus, not intractable, unspecified migraine type  G43.909 346.90   4. Wellness examination  Z00.00 V70.0        Plan:     1. Pain in toe of right foot  X-ray ordered  No redness, swelling, deformity noted  - X-Ray Foot 2 View Right; Future    2. Left ear pain  TM's clear bilaterally  No cerumen noted bilaterally  Patient denies pain today  Patient instructed on proper ear plug usage/cleaning  Patient informed to use Claritin/Flonase OTC prn nasal/ear congestion    3. Migraine without status migrainosus, not intractable, unspecified migraine type  Followed by Neurology    4. Wellness examination  - CBC Auto Differential; Future  - Comprehensive Metabolic Panel; Future  - Hemoglobin A1C; Future  - Lipid Panel; Future  - Urinalysis; Future  - TSH; Future  - Urinalysis      Follow up in about 6 months (around 2/24/2024) for Labs, Virtual Visit. In addition to their scheduled follow up, the patient has also been instructed to follow up on as needed basis.     TASHI Reynoso    "

## 2023-08-31 ENCOUNTER — PATIENT MESSAGE (OUTPATIENT)
Dept: INTERNAL MEDICINE | Facility: CLINIC | Age: 38
End: 2023-08-31
Payer: MEDICAID

## 2023-09-01 ENCOUNTER — PATIENT MESSAGE (OUTPATIENT)
Dept: NEUROLOGY | Facility: CLINIC | Age: 38
End: 2023-09-01
Payer: MEDICAID

## 2023-09-05 DIAGNOSIS — G43.909 MIGRAINE WITHOUT STATUS MIGRAINOSUS, NOT INTRACTABLE, UNSPECIFIED MIGRAINE TYPE: Primary | ICD-10-CM

## 2023-09-05 RX ORDER — UBROGEPANT 50 MG/1
50 TABLET ORAL
Qty: 16 TABLET | Refills: 2 | Status: SHIPPED | OUTPATIENT
Start: 2023-09-05 | End: 2023-09-15 | Stop reason: SDUPTHER

## 2023-09-15 ENCOUNTER — PATIENT MESSAGE (OUTPATIENT)
Dept: NEUROLOGY | Facility: CLINIC | Age: 38
End: 2023-09-15
Payer: MEDICAID

## 2023-09-15 DIAGNOSIS — G43.909 MIGRAINE WITHOUT STATUS MIGRAINOSUS, NOT INTRACTABLE, UNSPECIFIED MIGRAINE TYPE: ICD-10-CM

## 2023-09-15 RX ORDER — UBROGEPANT 50 MG/1
50 TABLET ORAL
Qty: 16 TABLET | Refills: 5 | Status: SHIPPED | OUTPATIENT
Start: 2023-09-15

## 2023-09-22 ENCOUNTER — PATIENT MESSAGE (OUTPATIENT)
Dept: INTERNAL MEDICINE | Facility: CLINIC | Age: 38
End: 2023-09-22
Payer: MEDICAID

## 2023-10-06 NOTE — PROGRESS NOTES
[facility] Neurology Initial Office Visit Note    Initial Visit Date: 10/10/2023  Current Visit Date:  10/10/2023    Chief Complaint:     Chief Complaint   Patient presents with    Patient presents today with a hx of migraines     Patient states she has about 9  migraines monthly       History of Present Illness:      This is 38 y.o. female with history of anxiety disorder, OCD, who is referred chronic tension headache and episodic migraine without aura.     Age of Onset : childhood    Headache Description: bitemporal, throbbing, severe, impeding day to day activity, lasting 3 days, with nausea, with photophobia and phonophobia. + visual aura. Wake up with a headache.     Frequency: 9 migraine headache days per month.    Provocation Factors:  Menstruation.    Risk Factors  - Family history of headache disorder: No  - History of focal CNS lesions: No  - History of CNS infections: No  - History head trauma: No  - History of underlying mood disorder: Yes anxiety disorder, OCD. Finished graduate school and just started working.   - History of sleep disorder: Yes had not been sleeping well due to racing thoughts. Bruxism.  - Recreational drug use: No  - Tobacco use: No  - Alcohol use: Yes occasional   - Weight fluctuation: No  - Isotretinoin or Tetracycline use:  Not Applicable  - Family planning and contraceptive use: Yes IUD    Medications:     Current Prophylactic  Escitalopram 10 mg daily    Current Abortive  Sumatriptan 50 mg once a day as needed (2/23/2023 to present): have not tried it yet.   Ubrogepant 50 mg twice a day as needed (9/1/2023 to present): Effective. Partially effective.      Prior Prophylactic  Denied     Prior Abortive  Flexeril    Devices:     - Nerivio neuromodulator: every other day and as needed. Partially effective.     Procedures:     - Botox:  - PSG block:   - Occipital nerve block:     Labs:     Results for orders placed or performed in visit on 01/05/23    PAP SMEAR   Result Value Ref Range     PAP Recommendation External No follow-up frequency specified     Pap Negative for intraephithelial lesion or malignancy Negative for intraephithelial lesion or malignancy, Other       Studies:     - MRI Brain:   - MRA Head w/o Irving:   - MRV Head w/o Irving:   - NCHCT:  - Lumbar Puncture:    Review of Systems:     Review of Systems   All other systems reviewed and are negative.      Physical Exams:     Vitals:    10/10/23 1016   BP: 112/74   Pulse: 67   Temp: 98 °F (36.7 °C)       Physical Exam  Vitals and nursing note reviewed.   Constitutional:       Appearance: Normal appearance.   HENT:      Head: Normocephalic and atraumatic.      Comments: Left TMJ subluxation with opening. Stylomastoid angle tenderness to palpation bilateral. Masseter tenderness to palpation.      Nose: Nose normal.      Mouth/Throat:      Mouth: Mucous membranes are moist.      Pharynx: Oropharynx is clear.   Eyes:      Conjunctiva/sclera: Conjunctivae normal.   Cardiovascular:      Rate and Rhythm: Normal rate and regular rhythm.      Pulses: Normal pulses.   Pulmonary:      Effort: Pulmonary effort is normal.      Breath sounds: Normal breath sounds.   Abdominal:      General: Abdomen is flat.   Musculoskeletal:         General: Normal range of motion.      Cervical back: Normal range of motion.   Skin:     General: Skin is warm.   Neurological:      Mental Status: She is alert.         Comprehensive Neurological Exam:  Mental Status: Alert Oriented to Self, Date, and Place.  Comprehension wnl. No dysarthria.   CN II - XII: ANIL, No APD, Fundus wnl OU,VFFC, No ptosis OU, EOMI without nystagmus LT/Temp symmetric in CN V1-3 distribution, Hearing grossly intact, Face Symmetric, Tongue and Uvula midline, Trapezius symmetric bilateral.   Motor: tone and bulk wnl throughout, no abnormal involuntary or voluntary movements, 5/5 to confrontation, Fine finger movements wnl b/l, No pronator drift.   Sensory: LT, Proprioception, Vibration, PP, Temp  symmetric.  Reflexes: 2+ throughout, plantar reflexes downward bilateral.   Cerebellar: FNF wnl b/l, RAHM wnl b/l  Romberg: Negative  Gait: normal. Heel Gait, Toe Gait, Tandem Gait wnl.     Assessment:     This is 38 y.o. female with history of anxiety disorder, OCD, who is referred chronic tension headache and episodic migraine without aura and severe bruxism with TMJ subluxation.       Problem List Items Addressed This Visit          Neuro    Tension headache    Migraine without status migrainosus, not intractable       ENT    Bruxism (teeth grinding) - Primary       Plan:     [] continue with Ubrogepant 50 mg twice a day as needed   [] start Tizanidine 2 mg twice a day as needed PRN  [] Neuromodulator Nerivio   [] Advised to patient discuss with dentist regarding symptomatic severe bruxism and TMJ arthropathy.   [] Oromandibular Dystonia Botox Protocol:  A. Masseter: 10 Units total Right: 5 units, Left: 5 Units  D. Temporalis Botox dosage: 40 Units divided in 8 sites Right: 20 Left: 20     Total Units Injected: 50 units   Total Units discarded: 50 units       RTC 3 Months     Headache education provided: good sleep hygiene and 7 hours of sleep per night, stress management, medication overuse education provided. Using more 3 OTC per week may worsen headaches, high intensity interval training has shown to reduce headache frequency. Low carb, high protein has shown to reduce headache frequency. Patient is instructed in keep headache diary.     I have explained the treatment plan, diagnosis, and prognosis to patient. All questions are answered to the best of my knowledge.     Face to face time 60 minutes, including documentation, chart review, counseling, education, review of test results, relevant medical records, and coordination of care.       Teri Benoit MD   General Neurology  10/10/2023    Patient called back to inquire regarding scheduling for Botox for oromandibular dystonia.

## 2023-10-10 ENCOUNTER — OFFICE VISIT (OUTPATIENT)
Dept: NEUROLOGY | Facility: CLINIC | Age: 38
End: 2023-10-10
Payer: MEDICAID

## 2023-10-10 VITALS
OXYGEN SATURATION: 99 % | SYSTOLIC BLOOD PRESSURE: 112 MMHG | WEIGHT: 149 LBS | BODY MASS INDEX: 23.39 KG/M2 | HEART RATE: 67 BPM | HEIGHT: 67 IN | TEMPERATURE: 98 F | DIASTOLIC BLOOD PRESSURE: 74 MMHG

## 2023-10-10 DIAGNOSIS — M26.622 ARTHRALGIA OF LEFT TEMPOROMANDIBULAR JOINT: ICD-10-CM

## 2023-10-10 DIAGNOSIS — F45.8 BRUXISM (TEETH GRINDING): ICD-10-CM

## 2023-10-10 DIAGNOSIS — G43.009 MIGRAINE WITHOUT AURA AND WITHOUT STATUS MIGRAINOSUS, NOT INTRACTABLE: Primary | ICD-10-CM

## 2023-10-10 DIAGNOSIS — G24.4 OROMANDIBULAR DYSTONIA: ICD-10-CM

## 2023-10-10 DIAGNOSIS — G44.209 TENSION HEADACHE: ICD-10-CM

## 2023-10-10 PROCEDURE — 3078F PR MOST RECENT DIASTOLIC BLOOD PRESSURE < 80 MM HG: ICD-10-PCS | Mod: CPTII,,, | Performed by: PSYCHIATRY & NEUROLOGY

## 2023-10-10 PROCEDURE — 99205 OFFICE O/P NEW HI 60 MIN: CPT | Mod: S$PBB,,, | Performed by: PSYCHIATRY & NEUROLOGY

## 2023-10-10 PROCEDURE — 3074F SYST BP LT 130 MM HG: CPT | Mod: CPTII,,, | Performed by: PSYCHIATRY & NEUROLOGY

## 2023-10-10 PROCEDURE — 99215 OFFICE O/P EST HI 40 MIN: CPT | Mod: PBBFAC | Performed by: PSYCHIATRY & NEUROLOGY

## 2023-10-10 PROCEDURE — 3008F PR BODY MASS INDEX (BMI) DOCUMENTED: ICD-10-PCS | Mod: CPTII,,, | Performed by: PSYCHIATRY & NEUROLOGY

## 2023-10-10 PROCEDURE — 99205 PR OFFICE/OUTPT VISIT, NEW, LEVL V, 60-74 MIN: ICD-10-PCS | Mod: S$PBB,,, | Performed by: PSYCHIATRY & NEUROLOGY

## 2023-10-10 PROCEDURE — 3078F DIAST BP <80 MM HG: CPT | Mod: CPTII,,, | Performed by: PSYCHIATRY & NEUROLOGY

## 2023-10-10 PROCEDURE — 3074F PR MOST RECENT SYSTOLIC BLOOD PRESSURE < 130 MM HG: ICD-10-PCS | Mod: CPTII,,, | Performed by: PSYCHIATRY & NEUROLOGY

## 2023-10-10 PROCEDURE — 1159F PR MEDICATION LIST DOCUMENTED IN MEDICAL RECORD: ICD-10-PCS | Mod: CPTII,,, | Performed by: PSYCHIATRY & NEUROLOGY

## 2023-10-10 PROCEDURE — 3008F BODY MASS INDEX DOCD: CPT | Mod: CPTII,,, | Performed by: PSYCHIATRY & NEUROLOGY

## 2023-10-10 PROCEDURE — 1159F MED LIST DOCD IN RCRD: CPT | Mod: CPTII,,, | Performed by: PSYCHIATRY & NEUROLOGY

## 2023-10-10 RX ORDER — TIZANIDINE 2 MG/1
2 TABLET ORAL EVERY 8 HOURS PRN
Qty: 90 TABLET | Refills: 4 | Status: SHIPPED | OUTPATIENT
Start: 2023-10-10 | End: 2024-03-08

## 2023-10-15 ENCOUNTER — PATIENT MESSAGE (OUTPATIENT)
Dept: NEUROLOGY | Facility: CLINIC | Age: 38
End: 2023-10-15
Payer: MEDICAID

## 2023-10-16 ENCOUNTER — LAB VISIT (OUTPATIENT)
Dept: LAB | Facility: HOSPITAL | Age: 38
End: 2023-10-16
Attending: NURSE PRACTITIONER
Payer: MEDICAID

## 2023-10-16 DIAGNOSIS — Z00.00 WELLNESS EXAMINATION: ICD-10-CM

## 2023-10-16 LAB
ALBUMIN SERPL-MCNC: 4.2 G/DL (ref 3.5–5)
ALBUMIN/GLOB SERPL: 1.4 RATIO (ref 1.1–2)
ALP SERPL-CCNC: 40 UNIT/L (ref 40–150)
ALT SERPL-CCNC: 14 UNIT/L (ref 0–55)
APPEARANCE UR: ABNORMAL
AST SERPL-CCNC: 19 UNIT/L (ref 5–34)
BACTERIA #/AREA URNS AUTO: ABNORMAL /HPF
BASOPHILS # BLD AUTO: 0.07 X10(3)/MCL
BASOPHILS NFR BLD AUTO: 1 %
BILIRUB SERPL-MCNC: 0.7 MG/DL
BILIRUB UR QL STRIP.AUTO: NEGATIVE
BUN SERPL-MCNC: 9.8 MG/DL (ref 7–18.7)
CALCIUM SERPL-MCNC: 9.6 MG/DL (ref 8.4–10.2)
CHLORIDE SERPL-SCNC: 106 MMOL/L (ref 98–107)
CHOLEST SERPL-MCNC: 160 MG/DL
CHOLEST/HDLC SERPL: 3 {RATIO} (ref 0–5)
CO2 SERPL-SCNC: 27 MMOL/L (ref 22–29)
COLOR UR AUTO: YELLOW
CREAT SERPL-MCNC: 0.91 MG/DL (ref 0.55–1.02)
EOSINOPHIL # BLD AUTO: 0.18 X10(3)/MCL (ref 0–0.9)
EOSINOPHIL NFR BLD AUTO: 2.5 %
ERYTHROCYTE [DISTWIDTH] IN BLOOD BY AUTOMATED COUNT: 13.1 % (ref 11.5–17)
EST. AVERAGE GLUCOSE BLD GHB EST-MCNC: 96.8 MG/DL
GFR SERPLBLD CREATININE-BSD FMLA CKD-EPI: >60 MLS/MIN/1.73/M2
GLOBULIN SER-MCNC: 2.9 GM/DL (ref 2.4–3.5)
GLUCOSE SERPL-MCNC: 90 MG/DL (ref 74–100)
GLUCOSE UR QL STRIP.AUTO: NORMAL
HBA1C MFR BLD: 5 %
HCT VFR BLD AUTO: 42.3 % (ref 37–47)
HDLC SERPL-MCNC: 59 MG/DL (ref 35–60)
HGB BLD-MCNC: 13.7 G/DL (ref 12–16)
HYALINE CASTS #/AREA URNS LPF: ABNORMAL /LPF
IMM GRANULOCYTES # BLD AUTO: 0 X10(3)/MCL (ref 0–0.04)
IMM GRANULOCYTES NFR BLD AUTO: 0 %
KETONES UR QL STRIP.AUTO: NEGATIVE
LDLC SERPL CALC-MCNC: 90 MG/DL (ref 50–140)
LEUKOCYTE ESTERASE UR QL STRIP.AUTO: 500
LYMPHOCYTES # BLD AUTO: 3.32 X10(3)/MCL (ref 0.6–4.6)
LYMPHOCYTES NFR BLD AUTO: 46.9 %
MCH RBC QN AUTO: 29.2 PG (ref 27–31)
MCHC RBC AUTO-ENTMCNC: 32.4 G/DL (ref 33–36)
MCV RBC AUTO: 90.2 FL (ref 80–94)
MONOCYTES # BLD AUTO: 0.52 X10(3)/MCL (ref 0.1–1.3)
MONOCYTES NFR BLD AUTO: 7.3 %
MUCOUS THREADS URNS QL MICRO: ABNORMAL /LPF
NEUTROPHILS # BLD AUTO: 2.99 X10(3)/MCL (ref 2.1–9.2)
NEUTROPHILS NFR BLD AUTO: 42.3 %
NITRITE UR QL STRIP.AUTO: NEGATIVE
NRBC BLD AUTO-RTO: 0 %
PH UR STRIP.AUTO: 7.5 [PH]
PLATELET # BLD AUTO: 263 X10(3)/MCL (ref 130–400)
PMV BLD AUTO: 10.6 FL (ref 7.4–10.4)
POTASSIUM SERPL-SCNC: 4.1 MMOL/L (ref 3.5–5.1)
PROT SERPL-MCNC: 7.1 GM/DL (ref 6.4–8.3)
PROT UR QL STRIP.AUTO: ABNORMAL
RBC # BLD AUTO: 4.69 X10(6)/MCL (ref 4.2–5.4)
RBC #/AREA URNS AUTO: ABNORMAL /HPF
RBC UR QL AUTO: ABNORMAL
SODIUM SERPL-SCNC: 140 MMOL/L (ref 136–145)
SP GR UR STRIP.AUTO: 1.02 (ref 1–1.03)
SQUAMOUS #/AREA URNS LPF: ABNORMAL /HPF
TRIGL SERPL-MCNC: 55 MG/DL (ref 37–140)
TSH SERPL-ACNC: 1.78 UIU/ML (ref 0.35–4.94)
UROBILINOGEN UR STRIP-ACNC: NORMAL
VLDLC SERPL CALC-MCNC: 11 MG/DL
WBC # SPEC AUTO: 7.08 X10(3)/MCL (ref 4.5–11.5)
WBC #/AREA URNS AUTO: ABNORMAL /HPF

## 2023-10-16 PROCEDURE — 80061 LIPID PANEL: CPT

## 2023-10-16 PROCEDURE — 85025 COMPLETE CBC W/AUTO DIFF WBC: CPT

## 2023-10-16 PROCEDURE — 36415 COLL VENOUS BLD VENIPUNCTURE: CPT

## 2023-10-16 PROCEDURE — 80053 COMPREHEN METABOLIC PANEL: CPT

## 2023-10-16 PROCEDURE — 83036 HEMOGLOBIN GLYCOSYLATED A1C: CPT

## 2023-10-16 PROCEDURE — 84443 ASSAY THYROID STIM HORMONE: CPT

## 2023-10-18 LAB — BACTERIA UR CULT: NORMAL

## 2023-10-27 ENCOUNTER — OFFICE VISIT (OUTPATIENT)
Dept: INTERNAL MEDICINE | Facility: CLINIC | Age: 38
End: 2023-10-27
Payer: MEDICAID

## 2023-10-27 DIAGNOSIS — L70.9 ACNE, UNSPECIFIED ACNE TYPE: ICD-10-CM

## 2023-10-27 DIAGNOSIS — M79.601 RIGHT ARM PAIN: Primary | ICD-10-CM

## 2023-10-27 DIAGNOSIS — L71.9 ROSACEA: ICD-10-CM

## 2023-10-27 PROCEDURE — 1160F RVW MEDS BY RX/DR IN RCRD: CPT | Mod: CPTII,95,, | Performed by: NURSE PRACTITIONER

## 2023-10-27 PROCEDURE — 1159F PR MEDICATION LIST DOCUMENTED IN MEDICAL RECORD: ICD-10-PCS | Mod: CPTII,95,, | Performed by: NURSE PRACTITIONER

## 2023-10-27 PROCEDURE — 3044F PR MOST RECENT HEMOGLOBIN A1C LEVEL <7.0%: ICD-10-PCS | Mod: CPTII,95,, | Performed by: NURSE PRACTITIONER

## 2023-10-27 PROCEDURE — 3044F HG A1C LEVEL LT 7.0%: CPT | Mod: CPTII,95,, | Performed by: NURSE PRACTITIONER

## 2023-10-27 PROCEDURE — 99213 PR OFFICE/OUTPT VISIT, EST, LEVL III, 20-29 MIN: ICD-10-PCS | Mod: 95,,, | Performed by: NURSE PRACTITIONER

## 2023-10-27 PROCEDURE — 1159F MED LIST DOCD IN RCRD: CPT | Mod: CPTII,95,, | Performed by: NURSE PRACTITIONER

## 2023-10-27 PROCEDURE — 1160F PR REVIEW ALL MEDS BY PRESCRIBER/CLIN PHARMACIST DOCUMENTED: ICD-10-PCS | Mod: CPTII,95,, | Performed by: NURSE PRACTITIONER

## 2023-10-27 PROCEDURE — 99213 OFFICE O/P EST LOW 20 MIN: CPT | Mod: 95,,, | Performed by: NURSE PRACTITIONER

## 2023-10-27 RX ORDER — METHYLPREDNISOLONE 4 MG/1
TABLET ORAL
Qty: 21 EACH | Refills: 0 | Status: SHIPPED | OUTPATIENT
Start: 2023-10-27 | End: 2023-11-17

## 2023-10-27 NOTE — PROGRESS NOTES
Patient ID: [id number]     Chief Complaint: Follow-up, Referral, and Results (Request PT for arm , elbow)    HPI:     The patient location is: work  The chief complaint leading to consultation is: right arm pain    Visit type: audiovisual    Face to Face time with patient: 20  30 minutes of total time spent on the encounter, which includes face to face time and non-face to face time preparing to see the patient (eg, review of tests), Obtaining and/or reviewing separately obtained history, Documenting clinical information in the electronic or other health record, Independently interpreting results (not separately reported) and communicating results to the patient/family/caregiver, or Care coordination (not separately reported).     Each patient to whom he or she provides medical services by telemedicine is:  (1) informed of the relationship between the physician and patient and the respective role of any other health care provider with respect to management of the patient; and (2) notified that he or she may decline to receive medical services by telemedicine and may withdraw from such care at any time.      Teodora Santos is a 38 y.o. female with diagnosis of Migraine. Patient seen today for elbow pain. Last appointment on 08/24/2023.   Today, patient states right arm pain (shoulder, elbow, wrist) x6 months. Patient denies injury. States some weakness. 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 Taking Tylenol/Ibuprofen OTC for pain, not helping. Patient requesting referral for physical therapy. Patient denies history of Carpal Tunnel.  Patient also states she is followed by Dermatology online ([facility]) and was started on creams for acne, rosacea, and perioral dermatitis. Patient would like referral to Dermatology Clinic at [facility].     Patient followed by Neurology. Last appointment on 10/10/2023. Migraine without status migrainosus, not intractable, unspecified migraine type. Tension headache. Bruxism: continue  with Ubrogepant 50 mg twice a day as needed. start Tizanidine 2 mg twice a day as needed PRN. Neuromodulator Nerivio. Advised to patient discuss with dentist regarding symptomatic severe bruxism and TMJ arthropathy. Patient has follow up appointment scheduled for 2024.     Review of patient's allergies indicates:  No Known Allergies    Breast Cancer Screening:  deferred due to age  Cervical Cancer Screenin2022, followed by Dr. Caballero  Colorectal Cancer Screening: deferred due to age  Diabetic Eye Exam: N/A  Diabetic Foot Exam: N/A  Lung Cancer Screening: N/A  Prostate Cancer Screening: N/A  AAA Screening: N/A  Osteoporosis Screening: N/A  Medicare Wellness: N/A  Immunizations:   There is no immunization history on file for this patient.    Past Surgical History:   Procedure Laterality Date    Closure of skin laceration by suture      TONSILLECTOMY      WISDOM TOOTH EXTRACTION       family history includes Anxiety disorder in her mother and sister; Colon cancer in her paternal grandmother; Hypertension in her father; Skin cancer in her paternal grandfather.    Social History     Socioeconomic History    Marital status: Significant Other   Tobacco Use    Smoking status: Former     Types: Cigarettes    Smokeless tobacco: Never   Substance and Sexual Activity    Alcohol use: Yes     Alcohol/week: 1.0 standard drink of alcohol     Types: 1 Cans of beer per week     Comment: 3-4 nights a week  beer    Drug use: Never    Sexual activity: Yes     Partners: Male     Birth control/protection: Condom     Current Outpatient Medications   Medication Instructions    ALPRAZolam (XANAX) 0.5 MG tablet 1 tablet, Oral, As needed (PRN)    digital therapeutic,INGRID device (NERIVIO DIGITAL ADEN, MIGRAINE,) Misc 1 kit, Apply Externally, Daily PRN    EScitalopram oxalate (LEXAPRO) 10 mg, Oral, Daily    ibuprofen (ADVIL,MOTRIN) 200 mg, Oral, Every 6 hours PRN    ketorolac (TORADOL) 10 mg, Oral, Every 6 hours PRN, Migraine  cocktail to take with zofran and benadryl    methylPREDNISolone (MEDROL DOSEPACK) 4 mg tablet use as directed    miscellaneous medical supply Kit 1 application , Other, Daily PRN, Nerivio device    ondansetron (ZOFRAN) 8 mg, Oral, Every 6 hours PRN, Migraine cocktail to be taken with ketorolac and benadryl    sumatriptan (IMITREX) 50 MG tablet Take 1 tablet prn once for migraine, may repeat dose in 2 hours    tiZANidine (ZANAFLEX) 2 mg, Oral, Every 8 hours PRN    tretinoin (ALTRENO TOP) 1 %, Topical (Top), As needed (PRN)    UBRELVY 50 mg, Oral, As needed (PRN)    UNABLE TO FIND Transdermal, As needed (PRN), Nerivio    UNABLE TO FIND 1 Scoop, Oral, Daily, Beam supplement       Subjective:     Review of Systems   Constitutional: Negative.    HENT: Negative.     Eyes: Negative.    Respiratory: Negative.     Cardiovascular: Negative.    Gastrointestinal: Negative.    Endocrine: Negative.    Genitourinary: Negative.    Musculoskeletal:  Positive for arthralgias.   Skin: Negative.         acne   Allergic/Immunologic: Negative.    Neurological: Negative.    Hematological: Negative.    Psychiatric/Behavioral: Negative.         Objective:     There were no vitals taken for this visit.      Physical Exam  Neurological:      Mental Status: She is alert and oriented to person, place, and time.   Psychiatric:         Mood and Affect: Mood normal.       Labs Reviewed:     Hematology:  Lab Results   Component Value Date    WBC 7.08 10/16/2023    HGB 13.7 10/16/2023    HCT 42.3 10/16/2023     10/16/2023     Chemistry:  Lab Results   Component Value Date     10/16/2023    K 4.1 10/16/2023    CHLORIDE 106 10/16/2023    BUN 9.8 10/16/2023    CREATININE 0.91 10/16/2023    EGFRNORACEVR >60 10/16/2023    GLUCOSE 90 10/16/2023    CALCIUM 9.6 10/16/2023    ALKPHOS 40 10/16/2023    LABPROT 7.1 10/16/2023    ALBUMIN 4.2 10/16/2023    BILIDIR 0.2 04/30/2019    IBILI 0.6 04/30/2019    AST 19 10/16/2023    ALT 14 10/16/2023    MG  2.30 04/13/2022     Lab Results   Component Value Date    HGBA1C 5.0 10/16/2023     Lipid Panel:  Lab Results   Component Value Date    CHOL 160 10/16/2023    HDL 59 10/16/2023    LDL 90.00 10/16/2023    TRIG 55 10/16/2023    TOTALCHOLEST 3 10/16/2023     Thyroid:  Lab Results   Component Value Date    TSH 1.777 10/16/2023     Urine:  Lab Results   Component Value Date    COLORUA Yellow 10/16/2023    APPEARANCEUA Turbid (A) 10/16/2023    SGUA 1.017 10/16/2023    PHUA 7.5 10/16/2023    PROTEINUA Trace (A) 10/16/2023    GLUCOSEUA Normal 10/16/2023    KETONESUA Negative 10/16/2023    BLOODUA 2+ (A) 10/16/2023    NITRITESUA Negative 10/16/2023    LEUKOCYTESUR 500 (A) 10/16/2023    RBCUA 6-10 (A) 10/16/2023    WBCUA 51-99 (A) 10/16/2023    BACTERIA Occ (A) 10/16/2023    SQEPUA Moderate (A) 10/16/2023    HYALINECASTS None Seen 10/16/2023        Assessment:       ICD-10-CM ICD-9-CM   1. Right arm pain  M79.601 729.5   2. Acne, unspecified acne type  L70.9 706.1   3. Rosacea  L71.9 695.3       Plan:     1. Right arm pain  Start Medrol Dose Pack  Physical Therapy referral ordered  - Ambulatory referral/consult to Physical/Occupational Therapy; Future    2. Acne, unspecified acne type  - Ambulatory referral/consult to Dermatology; Future    3. Rosacea  - Ambulatory referral/consult to Dermatology; Future      Follow up if symptoms worsen or fail to improve. In addition to their scheduled follow up, the patient has also been instructed to follow up on as needed basis.     TASHI Reynoso

## 2023-12-16 ENCOUNTER — PATIENT MESSAGE (OUTPATIENT)
Dept: INTERNAL MEDICINE | Facility: CLINIC | Age: 38
End: 2023-12-16
Payer: MEDICAID

## 2023-12-19 DIAGNOSIS — G24.4 OROMANDIBULAR DYSTONIA: ICD-10-CM

## 2024-01-09 ENCOUNTER — HOSPITAL ENCOUNTER (OUTPATIENT)
Dept: RADIOLOGY | Facility: HOSPITAL | Age: 39
Discharge: HOME | End: 2024-01-09
Attending: NURSE PRACTITIONER
Payer: MEDICAID

## 2024-01-09 ENCOUNTER — OFFICE VISIT (OUTPATIENT)
Dept: INTERNAL MEDICINE | Facility: CLINIC | Age: 39
End: 2024-01-09
Payer: MEDICAID

## 2024-01-09 VITALS
HEART RATE: 74 BPM | SYSTOLIC BLOOD PRESSURE: 101 MMHG | TEMPERATURE: 99 F | RESPIRATION RATE: 18 BRPM | BODY MASS INDEX: 24.58 KG/M2 | DIASTOLIC BLOOD PRESSURE: 68 MMHG | WEIGHT: 156.63 LBS | HEIGHT: 67 IN

## 2024-01-09 DIAGNOSIS — G89.29 CHRONIC LEFT-SIDED LOW BACK PAIN WITHOUT SCIATICA: ICD-10-CM

## 2024-01-09 DIAGNOSIS — M54.50 CHRONIC LEFT-SIDED LOW BACK PAIN WITHOUT SCIATICA: ICD-10-CM

## 2024-01-09 DIAGNOSIS — L70.9 ACNE, UNSPECIFIED ACNE TYPE: ICD-10-CM

## 2024-01-09 DIAGNOSIS — L71.9 ROSACEA: ICD-10-CM

## 2024-01-09 DIAGNOSIS — M54.50 CHRONIC LEFT-SIDED LOW BACK PAIN WITHOUT SCIATICA: Primary | ICD-10-CM

## 2024-01-09 DIAGNOSIS — G89.29 CHRONIC LEFT-SIDED LOW BACK PAIN WITHOUT SCIATICA: Primary | ICD-10-CM

## 2024-01-09 PROCEDURE — 3078F DIAST BP <80 MM HG: CPT | Mod: CPTII,,, | Performed by: NURSE PRACTITIONER

## 2024-01-09 PROCEDURE — 73502 X-RAY EXAM HIP UNI 2-3 VIEWS: CPT | Mod: TC,LT

## 2024-01-09 PROCEDURE — 99215 OFFICE O/P EST HI 40 MIN: CPT | Mod: PBBFAC | Performed by: NURSE PRACTITIONER

## 2024-01-09 PROCEDURE — 3008F BODY MASS INDEX DOCD: CPT | Mod: CPTII,,, | Performed by: NURSE PRACTITIONER

## 2024-01-09 PROCEDURE — 99213 OFFICE O/P EST LOW 20 MIN: CPT | Mod: S$PBB,,, | Performed by: NURSE PRACTITIONER

## 2024-01-09 PROCEDURE — 1159F MED LIST DOCD IN RCRD: CPT | Mod: CPTII,,, | Performed by: NURSE PRACTITIONER

## 2024-01-09 PROCEDURE — 72100 X-RAY EXAM L-S SPINE 2/3 VWS: CPT | Mod: TC

## 2024-01-09 PROCEDURE — 1160F RVW MEDS BY RX/DR IN RCRD: CPT | Mod: CPTII,,, | Performed by: NURSE PRACTITIONER

## 2024-01-09 PROCEDURE — 3074F SYST BP LT 130 MM HG: CPT | Mod: CPTII,,, | Performed by: NURSE PRACTITIONER

## 2024-01-09 RX ORDER — ESCITALOPRAM OXALATE 5 MG/1
2.5 TABLET ORAL DAILY
COMMUNITY
Start: 2023-11-11

## 2024-01-09 RX ORDER — CYCLOBENZAPRINE HCL 5 MG
5 TABLET ORAL 3 TIMES DAILY PRN
COMMUNITY
Start: 2024-01-08 | End: 2024-01-09

## 2024-01-09 RX ORDER — IVERMECTIN 10 MG/G
CREAM TOPICAL
COMMUNITY
Start: 2023-07-01

## 2024-01-09 RX ORDER — METRONIDAZOLE 10 MG/G
GEL TOPICAL
COMMUNITY
Start: 2023-07-01

## 2024-01-09 NOTE — PROGRESS NOTES
Patient ID: [id number]     Chief Complaint: Back Pain    HPI:     Teodora Santos is a 38 y.o. female with diagnosis of Migraine. Patient seen today for back pain. Last appointment on 10/27/2023.   Today, patient states lower back pain, left side.  Patient states she had an injury in 2016, x-ray done and was negative.  Patient states she completed physical therapy with improvement.  Patient states recently helping her boyfriend will and started to have pain to lower back again.  Patient states made an appointment with physical therapy and was given back exercises.  Patient states Tylenol/ibuprofen/does help with pain but pain does return.  Patient was prescribed Flexeril did not like the way the medication made her feel, medication change to Zanaflex the patient has not started medication yet.  Patient denies any sciatica pain, bowel/bladder incontinence, saddle anesthesia, bilateral sciatica.  Patient also states she is followed by Dermatology online ([facility]) and was started on creams for acne, rosacea, and perioral dermatitis. Patient would like referral to Dermatology Clinic at [facility]. Patient referred, no appointment noted.     Patient followed by Neurology. Last appointment on 10/10/2023. Migraine without status migrainosus, not intractable, unspecified migraine type. Tension headache. Bruxism: continue with Ubrogepant 50 mg twice a day as needed. start Tizanidine 2 mg twice a day as needed PRN. Neuromodulator Nerivio. Advised to patient discuss with dentist regarding symptomatic severe bruxism and TMJ arthropathy. Patient has follow up appointment scheduled for 2024.     Review of patient's allergies indicates:  No Known Allergies    Breast Cancer Screening:  deferred due to age  Cervical Cancer Screenin2022, followed by Dr. Caballero  Colorectal Cancer Screening: deferred due to age  Diabetic Eye Exam: N/A  Diabetic Foot Exam: N/A  Lung Cancer Screening: N/A  Prostate Cancer Screening: N/A  AAA  Screening: N/A  Osteoporosis Screening: N/A  Medicare Wellness: N/A  Immunizations:   There is no immunization history on file for this patient.    Past Surgical History:   Procedure Laterality Date    Closure of skin laceration by suture      TONSILLECTOMY      WISDOM TOOTH EXTRACTION       family history includes Anxiety disorder in her mother and sister; Colon cancer in her paternal grandmother; Hypertension in her father; Skin cancer in her paternal grandfather.    Social History     Socioeconomic History    Marital status: Significant Other   Tobacco Use    Smoking status: Former     Types: Cigarettes    Smokeless tobacco: Never   Substance and Sexual Activity    Alcohol use: Yes     Alcohol/week: 1.0 standard drink of alcohol     Types: 1 Cans of beer per week     Comment: 3-4 nights a week  beer    Drug use: Never    Sexual activity: Yes     Partners: Male     Birth control/protection: Condom     Social Determinants of Health     Financial Resource Strain: High Risk (1/9/2024)    Overall Financial Resource Strain (CARDIA)     Difficulty of Paying Living Expenses: Hard   Food Insecurity: No Food Insecurity (1/9/2024)    Hunger Vital Sign     Worried About Running Out of Food in the Last Year: Never true     Ran Out of Food in the Last Year: Never true   Transportation Needs: No Transportation Needs (1/9/2024)    PRAPARE - Transportation     Lack of Transportation (Medical): No     Lack of Transportation (Non-Medical): No   Physical Activity: Sufficiently Active (1/9/2024)    Exercise Vital Sign     Days of Exercise per Week: 6 days     Minutes of Exercise per Session: 60 min   Stress: Stress Concern Present (1/9/2024)    [employer] - Occupational Stress Questionnaire     Feeling of Stress : Rather much   Social Connections: Unknown (1/9/2024)    Social Connection and Isolation Panel [NHANES]     Frequency of Communication with Friends and Family: More than three times a week      Frequency of Social Gatherings with Friends and Family: Once a week     Active Member of Clubs or Organizations: Yes     Attends Club or Organization Meetings: 1 to 4 times per year     Marital Status: Living with partner   Housing Stability: High Risk (1/9/2024)    Housing Stability Vital Sign     Unable to Pay for Housing in the Last Year: Yes     Number of Places Lived in the Last Year: 1     Unstable Housing in the Last Year: No     Current Outpatient Medications   Medication Instructions    ALPRAZolam (XANAX) 0.5 MG tablet 1 tablet, Oral, As needed (PRN)    digital therapeutic,INGRID device (NERIVIO DIGITAL ADEN, MIGRAINE,) Misc 1 kit, Apply Externally, Daily PRN    EScitalopram oxalate (LEXAPRO) 10 mg, Oral, Daily    EScitalopram oxalate (LEXAPRO) 5 mg, Oral, Daily    ibuprofen (ADVIL,MOTRIN) 200 mg, Oral, Every 6 hours PRN    ivermectin (SOOLANTRA) 1 % Crea     ketorolac (TORADOL) 10 mg, Oral, Every 6 hours PRN, Migraine cocktail to take with zofran and benadryl    metronidazole 1% (METROGEL) 1 % Gel     miscellaneous medical supply Kit 1 application , Other, Daily PRN, Nerivio device    ondansetron (ZOFRAN) 8 mg, Oral, Every 6 hours PRN, Migraine cocktail to be taken with ketorolac and benadryl    sumatriptan (IMITREX) 50 MG tablet Take 1 tablet prn once for migraine, may repeat dose in 2 hours    tiZANidine (ZANAFLEX) 2 mg, Oral, Every 8 hours PRN    tretinoin (ALTRENO TOP) 1 %, Topical (Top), As needed (PRN)    UBRELVY 50 mg, Oral, As needed (PRN)    UNABLE TO FIND Transdermal, As needed (PRN), Nerivio    UNABLE TO FIND 1 Scoop, Oral, Daily, Beam supplement       Subjective:     Review of Systems   Constitutional: Negative.    HENT: Negative.     Eyes: Negative.    Respiratory: Negative.     Cardiovascular: Negative.    Gastrointestinal: Negative.    Endocrine: Negative.    Genitourinary: Negative.    Musculoskeletal:  Positive for back pain.   Skin: Negative.    Allergic/Immunologic: Negative.   "  Neurological: Negative.    Hematological: Negative.    Psychiatric/Behavioral: Negative.         Objective:     Visit Vitals  /68 (BP Location: Left arm, Patient Position: Sitting, BP Method: Medium (Automatic))   Pulse 74   Temp 98.5 °F (36.9 °C) (Oral)   Resp 18   Ht 5' 7.01" (1.702 m)   Wt 71 kg (156 lb 9.6 oz)   LMP 12/25/2023   BMI 24.52 kg/m²       Physical Exam  Vitals reviewed.   Constitutional:       Appearance: Normal appearance.   HENT:      Head: Normocephalic and atraumatic.      Mouth/Throat:      Mouth: Mucous membranes are moist.      Pharynx: Oropharynx is clear.   Eyes:      Extraocular Movements: Extraocular movements intact.      Conjunctiva/sclera: Conjunctivae normal.      Pupils: Pupils are equal, round, and reactive to light.   Cardiovascular:      Rate and Rhythm: Normal rate and regular rhythm.      Heart sounds: Normal heart sounds.   Pulmonary:      Effort: Pulmonary effort is normal.      Breath sounds: Normal breath sounds.   Abdominal:      General: Bowel sounds are normal.   Musculoskeletal:         General: Normal range of motion.      Cervical back: Normal range of motion.   Skin:     General: Skin is warm and dry.   Neurological:      Mental Status: She is alert and oriented to person, place, and time.   Psychiatric:         Mood and Affect: Mood normal.         Behavior: Behavior normal.       Labs Reviewed:     Hematology:  Lab Results   Component Value Date    WBC 7.08 10/16/2023    HGB 13.7 10/16/2023    HCT 42.3 10/16/2023     10/16/2023     Chemistry:  Lab Results   Component Value Date     10/16/2023    K 4.1 10/16/2023    CHLORIDE 106 10/16/2023    BUN 9.8 10/16/2023    CREATININE 0.91 10/16/2023    EGFRNORACEVR >60 10/16/2023    GLUCOSE 90 10/16/2023    CALCIUM 9.6 10/16/2023    ALKPHOS 40 10/16/2023    LABPROT 7.1 10/16/2023    ALBUMIN 4.2 10/16/2023    BILIDIR 0.2 04/30/2019    IBILI 0.6 04/30/2019    AST 19 10/16/2023    ALT 14 10/16/2023    MG 2.30 " 04/13/2022     Lab Results   Component Value Date    HGBA1C 5.0 10/16/2023     Lipid Panel:  Lab Results   Component Value Date    CHOL 160 10/16/2023    HDL 59 10/16/2023    LDL 90.00 10/16/2023    TRIG 55 10/16/2023    TOTALCHOLEST 3 10/16/2023     Thyroid:  Lab Results   Component Value Date    TSH 1.777 10/16/2023     Urine:  Lab Results   Component Value Date    COLORUA Yellow 10/16/2023    APPEARANCEUA Turbid (A) 10/16/2023    SGUA 1.017 10/16/2023    PHUA 7.5 10/16/2023    PROTEINUA Trace (A) 10/16/2023    GLUCOSEUA Normal 10/16/2023    KETONESUA Negative 10/16/2023    BLOODUA 2+ (A) 10/16/2023    NITRITESUA Negative 10/16/2023    LEUKOCYTESUR 500 (A) 10/16/2023    RBCUA 6-10 (A) 10/16/2023    WBCUA 51-99 (A) 10/16/2023    BACTERIA Occ (A) 10/16/2023    SQEPUA Moderate (A) 10/16/2023    HYALINECASTS None Seen 10/16/2023        Assessment:       ICD-10-CM ICD-9-CM   1. Chronic left-sided low back pain without sciatica  M54.50 724.2    G89.29 338.29   2. Acne, unspecified acne type  L70.9 706.1   3. Rosacea  L71.9 695.3       Plan:     1. Chronic left-sided low back pain without sciatica  Continue heat, tylenol, ibuprofen  - X-Ray Lumbar Spine 2 Or 3 Views; Future  - X-Ray Hip 2 or 3 views Left (with Pelvis when performed); Future    2. Acne, unspecified acne type  - Ambulatory referral/consult to Dermatology; Future    3. Rosacea  - Ambulatory referral/consult to Dermatology; Future      Follow up if symptoms worsen or fail to improve. In addition to their scheduled follow up, the patient has also been instructed to follow up on as needed basis.     Lorrie Franco, TASHI

## 2024-01-16 ENCOUNTER — TELEPHONE (OUTPATIENT)
Dept: INTERNAL MEDICINE | Facility: CLINIC | Age: 39
End: 2024-01-16
Payer: MEDICAID

## 2024-01-16 NOTE — TELEPHONE ENCOUNTER
Please notify patient that hip and back X-ray are negative. Did patient complete 12 weeks of physical therapy for back/hip pain? If not, I will send a referral to physical therapy.

## 2024-01-17 ENCOUNTER — PATIENT MESSAGE (OUTPATIENT)
Dept: INTERNAL MEDICINE | Facility: CLINIC | Age: 39
End: 2024-01-17
Payer: MEDICAID

## 2024-01-17 DIAGNOSIS — G89.29 CHRONIC MIDLINE LOW BACK PAIN WITHOUT SCIATICA: Primary | ICD-10-CM

## 2024-01-17 DIAGNOSIS — M54.50 CHRONIC MIDLINE LOW BACK PAIN WITHOUT SCIATICA: Primary | ICD-10-CM

## 2024-01-17 NOTE — TELEPHONE ENCOUNTER
[facility]   [address] [place] [id number]   Phone: [phone]   Fax: [phone]       [facility] with phone to schedule appt, order faxed will scan confirmation once received.

## 2024-01-17 NOTE — TELEPHONE ENCOUNTER
Pt advised of xray results, verbalized understanding with no questions. Pt stated she finished PT about 2year s ago.

## 2024-01-23 NOTE — PROGRESS NOTES
[facility] Neurology Follow Up Office Visit Note    Initial Visit Date: 10/10/2023  Last Visit Date: 10/10/2023  Current Visit Date:  01/24/2024    Chief Complaint:     Chief Complaint   Patient presents with    Migraine     Decided not to have Botox-has migraines 9-12 days out of the month.       History of Present Illness:      This is 38 y.o. female with history of anxiety disorder, OCD, who is referred chronic tension headache and episodic migraine without aura and severe bruxism with TMJ subluxation. During last visit, Tizanidine 2 mg twice a day as needed was started. Patient was scheduled for Botox for bruxism.     Age of Onset : childhood     Headache Description: bitemporal, throbbing, severe, impeding day to day activity, lasting 3 days, with nausea, with photophobia and phonophobia. + visual aura. Wake up with a headache.      Frequency: 9 migraine headache days per month.     Provocation Factors:  Menstruation.     Risk Factors  - Family history of headache disorder: No  - History of focal CNS lesions: No  - History of CNS infections: No  - History head trauma: No  - History of underlying mood disorder: Yes anxiety disorder, OCD. Finished graduate school and just started working.   - History of sleep disorder: Yes had not been sleeping well due to racing thoughts. Bruxism.  - Recreational drug use: No  - Tobacco use: No  - Alcohol use: Yes occasional   - Weight fluctuation: No  - Isotretinoin or Tetracycline use:  Not Applicable  - Family planning and contraceptive use: Yes IUD     Medications:     Current Prophylactic  Escitalopram 10 mg daily     Current Abortive  Sumatriptan 50 mg once a day as needed (2/23/2023 to present): have not tried it yet.   Ubrogepant 50 mg twice a day as needed (9/1/2023 to present): Effective. Partially effective.    Tizanidine 2 mg twice a day as needed      Prior Prophylactic  Denied      Prior Abortive  Flexeril    Devices:     - Nerivio neuromodulator: every other day and as  needed. Partially effective     Procedures:     - Botox:  - PSG block:   - Occipital nerve block:     Labs:     Results for orders placed or performed in visit on 10/16/23   Comprehensive Metabolic Panel   Result Value Ref Range    Sodium Level 140 136 - 145 mmol/L    Potassium Level 4.1 3.5 - 5.1 mmol/L    Chloride 106 98 - 107 mmol/L    Carbon Dioxide 27 22 - 29 mmol/L    Glucose Level 90 74 - 100 mg/dL    Blood Urea Nitrogen 9.8 7.0 - 18.7 mg/dL    Creatinine 0.91 0.55 - 1.02 mg/dL    Calcium Level Total 9.6 8.4 - 10.2 mg/dL    Protein Total 7.1 6.4 - 8.3 gm/dL    Albumin Level 4.2 3.5 - 5.0 g/dL    Globulin 2.9 2.4 - 3.5 gm/dL    Albumin/Globulin Ratio 1.4 1.1 - 2.0 ratio    Bilirubin Total 0.7 <=1.5 mg/dL    Alkaline Phosphatase 40 40 - 150 unit/L    Alanine Aminotransferase 14 0 - 55 unit/L    Aspartate Aminotransferase 19 5 - 34 unit/L    eGFR >60 mls/min/1.73/m2   Hemoglobin A1C   Result Value Ref Range    Hemoglobin A1c 5.0 <=7.0 %    Estimated Average Glucose 96.8 mg/dL   Lipid Panel   Result Value Ref Range    Cholesterol Total 160 <=200 mg/dL    HDL Cholesterol 59 35 - 60 mg/dL    Triglyceride 55 37 - 140 mg/dL    Cholesterol/HDL Ratio 3 0 - 5    Very Low Density Lipoprotein 11     LDL Cholesterol 90.00 50.00 - 140.00 mg/dL   TSH   Result Value Ref Range    TSH 1.777 0.350 - 4.940 uIU/mL   CBC with Differential   Result Value Ref Range    WBC 7.08 4.50 - 11.50 x10(3)/mcL    RBC 4.69 4.20 - 5.40 x10(6)/mcL    Hgb 13.7 12.0 - 16.0 g/dL    Hct 42.3 37.0 - 47.0 %    MCV 90.2 80.0 - 94.0 fL    MCH 29.2 27.0 - 31.0 pg    MCHC 32.4 (L) 33.0 - 36.0 g/dL    RDW 13.1 11.5 - 17.0 %    Platelet 263 130 - 400 x10(3)/mcL    MPV 10.6 (H) 7.4 - 10.4 fL    Neut % 42.3 %    Lymph % 46.9 %    Mono % 7.3 %    Eos % 2.5 %    Basophil % 1.0 %    Lymph # 3.32 0.6 - 4.6 x10(3)/mcL    Neut # 2.99 2.1 - 9.2 x10(3)/mcL    Mono # 0.52 0.1 - 1.3 x10(3)/mcL    Eos # 0.18 0 - 0.9 x10(3)/mcL    Baso # 0.07 <=0.2 x10(3)/mcL    IG# 0.00  0 - 0.04 x10(3)/mcL    IG% 0.0 %    NRBC% 0.0 %       Studies:     - MRI Brain:   - MRA Head w/o Irving:   - MRV Head w/o Irving:   - NCHCT:  - Lumbar Puncture:    Review of Systems:     Review of Systems   All other systems reviewed and are negative.      Physical Exams:     Vitals:    01/24/24 1053   BP: 106/74   Pulse: 87   Resp: 14   Temp: 97.8 °F (36.6 °C)       Physical Exam  Vitals and nursing note reviewed.   Constitutional:       Appearance: Normal appearance.   HENT:      Head: Normocephalic and atraumatic.      Comments: Left TMJ subluxation with opening. Stylomastoid angle tenderness to palpation bilateral. Masseter tenderness to palpation.      Nose: Nose normal.      Mouth/Throat:      Mouth: Mucous membranes are moist.      Pharynx: Oropharynx is clear.   Eyes:      Conjunctiva/sclera: Conjunctivae normal.   Cardiovascular:      Rate and Rhythm: Normal rate and regular rhythm.      Pulses: Normal pulses.   Pulmonary:      Effort: Pulmonary effort is normal.      Breath sounds: Normal breath sounds.   Abdominal:      General: Abdomen is flat.   Musculoskeletal:         General: Normal range of motion.      Cervical back: Normal range of motion.   Skin:     General: Skin is warm.   Neurological:      Mental Status: She is alert.         Comprehensive Neurological Exam:  Mental Status: Alert Oriented to Self, Date, and Place.  Comprehension wnl. No dysarthria.   CN II - XII: ANIL, No APD, Fundus wnl OU,VFFC, No ptosis OU, EOMI without nystagmus LT/Temp symmetric in CN V1-3 distribution, Hearing grossly intact, Face Symmetric, Tongue and Uvula midline, Trapezius symmetric bilateral.   Motor: tone and bulk wnl throughout, no abnormal involuntary or voluntary movements, 5/5 to confrontation, Fine finger movements wnl b/l, No pronator drift.   Sensory: LT, Proprioception, Vibration, PP, Temp symmetric.  Reflexes: 2+ throughout, plantar reflexes downward bilateral.   Cerebellar: FNF wnl b/l, RAHM wnl b/l  Romberg:  Negative  Gait: normal. Heel Gait, Toe Gait, Tandem Gait wnl.     Assessment:     This is 38 y.o. female with history of anxiety disorder, OCD, who is referred chronic tension headache and episodic migraine without aura and severe bruxism with TMJ subluxation.      Problem List Items Addressed This Visit          Neuro    Migraine without status migrainosus, not intractable - Primary       Plan:     [] continue with Ubrogepant 50 mg twice a day as needed   [] continue with Tizanidine 2 mg twice a day as needed  [] Neuromodulator Nerivio   [] Advised to patient discuss with dentist regarding symptomatic severe bruxism and TMJ arthropathy.     RTC 6 Months     Headache education provided: good sleep hygiene and 7 hours of sleep per night, stress management, medication overuse education provided. Using more 3 OTC per week may worsen headaches, high intensity interval training has shown to reduce headache frequency. Low carb, high protein has shown to reduce headache frequency. Patient is instructed in keep headache diary.     I have explained the treatment plan, diagnosis, and prognosis to patient. All questions are answered to the best of my knowledge.     Visit today is associated with current or anticipated ongoing medical care related to this patient's single serious condition/complex condition as documented above.     Face to face time 30 minutes, including documentation, chart review, counseling, education, review of test results, relevant medical records, and coordination of care.       Teri Benoit MD   General Neurology  01/24/2024

## 2024-01-24 ENCOUNTER — OFFICE VISIT (OUTPATIENT)
Dept: NEUROLOGY | Facility: CLINIC | Age: 39
End: 2024-01-24
Payer: MEDICAID

## 2024-01-24 VITALS
BODY MASS INDEX: 23.98 KG/M2 | SYSTOLIC BLOOD PRESSURE: 106 MMHG | HEIGHT: 67 IN | WEIGHT: 152.81 LBS | TEMPERATURE: 98 F | DIASTOLIC BLOOD PRESSURE: 74 MMHG | OXYGEN SATURATION: 99 % | RESPIRATION RATE: 14 BRPM | HEART RATE: 87 BPM

## 2024-01-24 DIAGNOSIS — G43.009 MIGRAINE WITHOUT AURA AND WITHOUT STATUS MIGRAINOSUS, NOT INTRACTABLE: Primary | ICD-10-CM

## 2024-01-24 PROCEDURE — 99214 OFFICE O/P EST MOD 30 MIN: CPT | Mod: S$PBB,,, | Performed by: PSYCHIATRY & NEUROLOGY

## 2024-01-24 PROCEDURE — 3074F SYST BP LT 130 MM HG: CPT | Mod: CPTII,,, | Performed by: PSYCHIATRY & NEUROLOGY

## 2024-01-24 PROCEDURE — 1159F MED LIST DOCD IN RCRD: CPT | Mod: CPTII,,, | Performed by: PSYCHIATRY & NEUROLOGY

## 2024-01-24 PROCEDURE — 3008F BODY MASS INDEX DOCD: CPT | Mod: CPTII,,, | Performed by: PSYCHIATRY & NEUROLOGY

## 2024-01-24 PROCEDURE — 3078F DIAST BP <80 MM HG: CPT | Mod: CPTII,,, | Performed by: PSYCHIATRY & NEUROLOGY

## 2024-01-24 PROCEDURE — 99215 OFFICE O/P EST HI 40 MIN: CPT | Mod: PBBFAC | Performed by: PSYCHIATRY & NEUROLOGY

## 2024-01-24 RX ORDER — AZELAIC ACID 0.15 G/G
GEL TOPICAL
COMMUNITY
Start: 2023-07-01

## 2024-02-28 ENCOUNTER — LAB VISIT (OUTPATIENT)
Dept: LAB | Facility: HOSPITAL | Age: 39
End: 2024-02-28
Attending: NURSE PRACTITIONER
Payer: MEDICAID

## 2024-02-28 DIAGNOSIS — M54.50 CHRONIC LEFT-SIDED LOW BACK PAIN WITHOUT SCIATICA: ICD-10-CM

## 2024-02-28 DIAGNOSIS — G89.29 CHRONIC LEFT-SIDED LOW BACK PAIN WITHOUT SCIATICA: ICD-10-CM

## 2024-02-28 LAB
ALBUMIN SERPL-MCNC: 4.1 G/DL (ref 3.5–5)
ALBUMIN/GLOB SERPL: 1.5 RATIO (ref 1.1–2)
ALP SERPL-CCNC: 41 UNIT/L (ref 40–150)
ALT SERPL-CCNC: 12 UNIT/L (ref 0–55)
AST SERPL-CCNC: 16 UNIT/L (ref 5–34)
BASOPHILS # BLD AUTO: 0.08 X10(3)/MCL
BASOPHILS NFR BLD AUTO: 0.9 %
BILIRUB SERPL-MCNC: 1 MG/DL
BUN SERPL-MCNC: 11.1 MG/DL (ref 7–18.7)
CALCIUM SERPL-MCNC: 9 MG/DL (ref 8.4–10.2)
CHLORIDE SERPL-SCNC: 105 MMOL/L (ref 98–107)
CO2 SERPL-SCNC: 27 MMOL/L (ref 22–29)
CREAT SERPL-MCNC: 0.84 MG/DL (ref 0.55–1.02)
EOSINOPHIL # BLD AUTO: 0.16 X10(3)/MCL (ref 0–0.9)
EOSINOPHIL NFR BLD AUTO: 1.9 %
ERYTHROCYTE [DISTWIDTH] IN BLOOD BY AUTOMATED COUNT: 12.8 % (ref 11.5–17)
GFR SERPLBLD CREATININE-BSD FMLA CKD-EPI: >60 MLS/MIN/1.73/M2
GLOBULIN SER-MCNC: 2.8 GM/DL (ref 2.4–3.5)
GLUCOSE SERPL-MCNC: 86 MG/DL (ref 74–100)
HCT VFR BLD AUTO: 40.4 % (ref 37–47)
HGB BLD-MCNC: 13.5 G/DL (ref 12–16)
IMM GRANULOCYTES # BLD AUTO: 0.01 X10(3)/MCL (ref 0–0.04)
IMM GRANULOCYTES NFR BLD AUTO: 0.1 %
LYMPHOCYTES # BLD AUTO: 2.68 X10(3)/MCL (ref 0.6–4.6)
LYMPHOCYTES NFR BLD AUTO: 31.8 %
MCH RBC QN AUTO: 30.1 PG (ref 27–31)
MCHC RBC AUTO-ENTMCNC: 33.4 G/DL (ref 33–36)
MCV RBC AUTO: 90.2 FL (ref 80–94)
MONOCYTES # BLD AUTO: 0.54 X10(3)/MCL (ref 0.1–1.3)
MONOCYTES NFR BLD AUTO: 6.4 %
NEUTROPHILS # BLD AUTO: 4.97 X10(3)/MCL (ref 2.1–9.2)
NEUTROPHILS NFR BLD AUTO: 58.9 %
NRBC BLD AUTO-RTO: 0 %
PLATELET # BLD AUTO: 280 X10(3)/MCL (ref 130–400)
PMV BLD AUTO: 10.8 FL (ref 7.4–10.4)
POTASSIUM SERPL-SCNC: 3.9 MMOL/L (ref 3.5–5.1)
PROT SERPL-MCNC: 6.9 GM/DL (ref 6.4–8.3)
RBC # BLD AUTO: 4.48 X10(6)/MCL (ref 4.2–5.4)
SODIUM SERPL-SCNC: 140 MMOL/L (ref 136–145)
WBC # SPEC AUTO: 8.44 X10(3)/MCL (ref 4.5–11.5)

## 2024-02-28 PROCEDURE — 36415 COLL VENOUS BLD VENIPUNCTURE: CPT

## 2024-02-28 PROCEDURE — 80053 COMPREHEN METABOLIC PANEL: CPT

## 2024-02-28 PROCEDURE — 85025 COMPLETE CBC W/AUTO DIFF WBC: CPT

## 2024-03-01 ENCOUNTER — OFFICE VISIT (OUTPATIENT)
Dept: INTERNAL MEDICINE | Facility: CLINIC | Age: 39
End: 2024-03-01
Payer: MEDICAID

## 2024-03-01 DIAGNOSIS — G43.009 MIGRAINE WITHOUT AURA AND WITHOUT STATUS MIGRAINOSUS, NOT INTRACTABLE: ICD-10-CM

## 2024-03-01 DIAGNOSIS — M54.9 DORSALGIA, UNSPECIFIED: Primary | ICD-10-CM

## 2024-03-01 PROCEDURE — 1160F RVW MEDS BY RX/DR IN RCRD: CPT | Mod: CPTII,95,, | Performed by: NURSE PRACTITIONER

## 2024-03-01 PROCEDURE — 99213 OFFICE O/P EST LOW 20 MIN: CPT | Mod: 95,,, | Performed by: NURSE PRACTITIONER

## 2024-03-01 PROCEDURE — 1159F MED LIST DOCD IN RCRD: CPT | Mod: CPTII,95,, | Performed by: NURSE PRACTITIONER

## 2024-03-01 NOTE — PROGRESS NOTES
Patient ID: [id number]     Chief Complaint: Follow-up, Anxiety, Results, and Headache (Continue to have headaches)    HPI:     The patient location is: work  The chief complaint leading to consultation is: follow up    Visit type: audiovisual    Face to Face time with patient: 16 minutes  20 minutes of total time spent on the encounter, which includes face to face time and non-face to face time preparing to see the patient (eg, review of tests), Obtaining and/or reviewing separately obtained history, Documenting clinical information in the electronic or other health record, Independently interpreting results (not separately reported) and communicating results to the patient/family/caregiver, or Care coordination (not separately reported).     Each patient to whom he or she provides medical services by telemedicine is:  (1) informed of the relationship between the physician and patient and the respective role of any other health care provider with respect to management of the patient; and (2) notified that he or she may decline to receive medical services by telemedicine and may withdraw from such care at any time.      Teodora Santos is a 39 y.o. female with diagnosis of Migraine. Patient seen today for follow up. Last appointment on 01/09/2024.   At previous appt, patient states low back pain, chronic. Patient using heat, tylenol, ibuprofen with some relief. Left Hip Xray completed 01/09/2024 and was negative. Lumbar X-ray completed and was negative. Patient referred to PT. Patient states she did not attend physical therapy because she had completed physical therapy previously. States she still has the home exercises given upon discharge from PT that she is still doing. Patient denies any sciatica pain, bowel/bladder incontinence, saddle anesthesia, bilateral sciatica.  Patient also states she is followed by Dermatology online ([employer]) and was started on creams for acne, rosacea, and perioral dermatitis.  Patient would like referral to Dermatology Clinic at [facility]. Patient referred, no appointment noted. Patient referred to Dr. Wellington Bush in [facility] appt scheduled for later this month.   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   Patient denies any other acute complaints.     Patient followed by Neurology. Last appointment on 2024. Migraine without status migrainosus, not intractable, unspecified migraine type: continue with Ubrogepant 50 mg twice a day as needed, Tizanidine 2 mg twice a day as needed. Neuromodulator Nerivio. Advised to patient discuss with dentist regarding symptomatic severe bruxism and TMJ arthropathy. Patient has follow up appointment scheduled for 2024.     Review of patient's allergies indicates:  No Known Allergies    Breast Cancer Screening:  deferred due to age  Cervical Cancer Screenin2022, followed by Dr. Caballero  Colorectal Cancer Screening: deferred due to age  Diabetic Eye Exam: N/A  Diabetic Foot Exam: N/A  Lung Cancer Screening: N/A  Prostate Cancer Screening: N/A  AAA Screening: N/A  Osteoporosis Screening: N/A  Medicare Wellness: N/A  Immunizations:   There is no immunization history on file for this patient.    Past Surgical History:   Procedure Laterality Date    Closure of skin laceration by suture      TONSILLECTOMY      WISDOM TOOTH EXTRACTION       family history includes Anxiety disorder in her mother and sister; Colon cancer in her paternal grandmother; Hypertension in her father; Skin cancer in her paternal grandfather.    Social History     Socioeconomic History    Marital status: Significant Other   Tobacco Use    Smoking status: Former     Types: Cigarettes    Smokeless tobacco: Never   Substance and Sexual Activity    Alcohol use: Yes     Alcohol/week: 1.0 standard drink of alcohol     Types: 1 Cans of beer per week     Comment: 3-4 nights a week  beer    Drug use: Never    Sexual activity: Yes      Partners: Male     Birth control/protection: Condom     Social Determinants of Health     Financial Resource Strain: High Risk (1/9/2024)    Overall Financial Resource Strain (CARDIA)     Difficulty of Paying Living Expenses: Hard   Food Insecurity: No Food Insecurity (1/9/2024)    Hunger Vital Sign     Worried About Running Out of Food in the Last Year: Never true     Ran Out of Food in the Last Year: Never true   Transportation Needs: No Transportation Needs (1/9/2024)    PRAPARE - Transportation     Lack of Transportation (Medical): No     Lack of Transportation (Non-Medical): No   Physical Activity: Sufficiently Active (1/9/2024)    Exercise Vital Sign     Days of Exercise per Week: 6 days     Minutes of Exercise per Session: 60 min   Stress: Stress Concern Present (1/9/2024)    [employer] [place] [employer] - Occupational Stress Questionnaire     Feeling of Stress : Rather much   Social Connections: Unknown (1/9/2024)    Social Connection and Isolation Panel [NHANES]     Frequency of Communication with Friends and Family: More than three times a week     Frequency of Social Gatherings with Friends and Family: Once a week     Active Member of Clubs or Organizations: Yes     Attends Club or Organization Meetings: 1 to 4 times per year     Marital Status: Living with partner   Housing Stability: High Risk (1/9/2024)    Housing Stability Vital Sign     Unable to Pay for Housing in the Last Year: Yes     Number of Places Lived in the Last Year: 1     Unstable Housing in the Last Year: No     Current Outpatient Medications   Medication Instructions    ALPRAZolam (XANAX) 0.5 MG tablet 1 tablet, Oral, As needed (PRN)    azelaic acid (AZELEX) 15 % gel Topical (Top), Combination medication with 2 other medications for perioral dematitis    digital therapeutic,INGRID device (NERIVIO DIGITAL ADEN, MIGRAINE,) Misc 1 kit, Apply Externally, Daily PRN    EScitalopram oxalate (LEXAPRO) 10 mg, Oral, Daily    EScitalopram  oxalate (LEXAPRO) 2.5 mg, Oral, Daily, Takes with 10mg to equal 12.5mg daily    ibuprofen (ADVIL,MOTRIN) 200 mg, Oral, Every 6 hours PRN    ivermectin (SOOLANTRA) 1 % Crea     ketorolac (TORADOL) 10 mg, Oral, Every 6 hours PRN, Migraine cocktail to take with zofran and benadryl    metronidazole 1% (METROGEL) 1 % Gel     miscellaneous medical supply Kit 1 application , Other, Daily PRN, Nerivio device    ondansetron (ZOFRAN) 8 mg, Oral, Every 6 hours PRN, Migraine cocktail to be taken with ketorolac and benadryl    sumatriptan (IMITREX) 50 MG tablet Take 1 tablet prn once for migraine, may repeat dose in 2 hours    tiZANidine (ZANAFLEX) 2 mg, Oral, Every 8 hours PRN    tretinoin (ALTRENO TOP) 1 %, Topical (Top), As needed (PRN)    UBRELVY 50 mg, Oral, As needed (PRN)    UNABLE TO FIND Transdermal, As needed (PRN), Nerivio       Subjective:     Review of Systems   Constitutional: Negative.  Negative for activity change and unexpected weight change.   HENT: Negative.  Negative for hearing loss, rhinorrhea and trouble swallowing.    Eyes: Negative.  Negative for discharge and visual disturbance.   Respiratory: Negative.  Negative for chest tightness and wheezing.    Cardiovascular: Negative.  Negative for chest pain and palpitations.   Gastrointestinal: Negative.  Negative for blood in stool, constipation, diarrhea and vomiting.   Endocrine: Negative.  Negative for polydipsia and polyuria.   Genitourinary: Negative.  Negative for difficulty urinating, dysuria, hematuria and menstrual problem.   Musculoskeletal:  Positive for back pain. Negative for joint swelling.   Skin: Negative.    Allergic/Immunologic: Negative.    Neurological: Negative.  Negative for weakness.   Hematological: Negative.    Psychiatric/Behavioral: Negative.  Negative for confusion and dysphoric mood.        Objective:     There were no vitals taken for this visit.      Physical Exam  Neurological:      Mental Status: She is alert and oriented to  person, place, and time.   Psychiatric:         Mood and Affect: Mood normal.       Labs Reviewed:     Hematology:  Lab Results   Component Value Date    WBC 8.44 02/28/2024    HGB 13.5 02/28/2024    HCT 40.4 02/28/2024     02/28/2024     Chemistry:  Lab Results   Component Value Date     02/28/2024    K 3.9 02/28/2024    CHLORIDE 105 02/28/2024    BUN 11.1 02/28/2024    CREATININE 0.84 02/28/2024    EGFRNORACEVR >60 02/28/2024    GLUCOSE 86 02/28/2024    CALCIUM 9.0 02/28/2024    ALKPHOS 41 02/28/2024    LABPROT 6.9 02/28/2024    ALBUMIN 4.1 02/28/2024    BILIDIR 0.2 04/30/2019    IBILI 0.6 04/30/2019    AST 16 02/28/2024    ALT 12 02/28/2024    MG 2.30 04/13/2022     Lab Results   Component Value Date    HGBA1C 5.0 10/16/2023     Lipid Panel:  Lab Results   Component Value Date    CHOL 160 10/16/2023    HDL 59 10/16/2023    LDL 90.00 10/16/2023    TRIG 55 10/16/2023    TOTALCHOLEST 3 10/16/2023     Thyroid:  Lab Results   Component Value Date    TSH 1.777 10/16/2023     Urine:  Lab Results   Component Value Date    COLORUA Yellow 10/16/2023    APPEARANCEUA Turbid (A) 10/16/2023    SGUA 1.017 10/16/2023    PHUA 7.5 10/16/2023    PROTEINUA Trace (A) 10/16/2023    GLUCOSEUA Normal 10/16/2023    KETONESUA Negative 10/16/2023    BLOODUA 2+ (A) 10/16/2023    NITRITESUA Negative 10/16/2023    LEUKOCYTESUR 500 (A) 10/16/2023    RBCUA 6-10 (A) 10/16/2023    WBCUA 51-99 (A) 10/16/2023    BACTERIA Occ (A) 10/16/2023    SQEPUA Moderate (A) 10/16/2023    HYALINECASTS None Seen 10/16/2023        Assessment:       ICD-10-CM ICD-9-CM   1. Dorsalgia, unspecified  M54.9 724.5   2. Migraine without aura and without status migrainosus, not intractable  G43.009 346.10       Plan:     1. Dorsalgia, unspecified  Continue OTC Ibuprofen, Tyelnol prn  - MRI Lumbar Spine Without Contrast; Future  - CBC Auto Differential; Future  - Comprehensive Metabolic Panel; Future  - Folate; Future    2. Migraine without aura and without  status migrainosus, not intractable  Followed by Neurology Clinic  Continue Ubrogepant and Tizanidine as needed      Follow up in about 6 months (around 9/1/2024) for Labs. In addition to their scheduled follow up, the patient has also been instructed to follow up on as needed basis.     Lorrie Franco, MELISSAP

## 2024-03-12 ENCOUNTER — PATIENT MESSAGE (OUTPATIENT)
Dept: INTERNAL MEDICINE | Facility: CLINIC | Age: 39
End: 2024-03-12
Payer: MEDICAID

## 2024-03-18 ENCOUNTER — PATIENT MESSAGE (OUTPATIENT)
Dept: INTERNAL MEDICINE | Facility: CLINIC | Age: 39
End: 2024-03-18
Payer: MEDICAID

## 2024-03-18 DIAGNOSIS — G43.909 MIGRAINE WITHOUT STATUS MIGRAINOSUS, NOT INTRACTABLE, UNSPECIFIED MIGRAINE TYPE: ICD-10-CM

## 2024-03-18 DIAGNOSIS — M79.602 LEFT ARM PAIN: Primary | ICD-10-CM

## 2024-03-22 ENCOUNTER — PATIENT MESSAGE (OUTPATIENT)
Dept: NEUROLOGY | Facility: CLINIC | Age: 39
End: 2024-03-22
Payer: MEDICAID

## 2024-04-01 ENCOUNTER — HOSPITAL ENCOUNTER (OUTPATIENT)
Dept: RADIOLOGY | Facility: HOSPITAL | Age: 39
Discharge: HOME | End: 2024-04-01
Attending: NURSE PRACTITIONER
Payer: MEDICAID

## 2024-04-01 DIAGNOSIS — M79.602 LEFT ARM PAIN: ICD-10-CM

## 2024-04-01 PROCEDURE — 76882 US LMTD JT/FCL EVL NVASC XTR: CPT | Mod: TC,LT

## 2024-04-04 ENCOUNTER — PATIENT MESSAGE (OUTPATIENT)
Dept: INTERNAL MEDICINE | Facility: CLINIC | Age: 39
End: 2024-04-04
Payer: MEDICAID

## 2024-05-03 ENCOUNTER — PATIENT MESSAGE (OUTPATIENT)
Dept: INTERNAL MEDICINE | Facility: CLINIC | Age: 39
End: 2024-05-03
Payer: MEDICAID

## 2024-05-08 DIAGNOSIS — M25.522 LEFT ELBOW PAIN: Primary | ICD-10-CM

## 2024-05-22 ENCOUNTER — HOSPITAL ENCOUNTER (OUTPATIENT)
Dept: RADIOLOGY | Facility: HOSPITAL | Age: 39
Discharge: HOME | End: 2024-05-22
Attending: NURSE PRACTITIONER
Payer: MEDICAID

## 2024-05-22 ENCOUNTER — OFFICE VISIT (OUTPATIENT)
Dept: INTERNAL MEDICINE | Facility: CLINIC | Age: 39
End: 2024-05-22
Payer: MEDICAID

## 2024-05-22 ENCOUNTER — DOCUMENTATION ONLY (OUTPATIENT)
Dept: INTERNAL MEDICINE | Facility: CLINIC | Age: 39
End: 2024-05-22

## 2024-05-22 VITALS
HEIGHT: 67 IN | BODY MASS INDEX: 24.39 KG/M2 | DIASTOLIC BLOOD PRESSURE: 76 MMHG | RESPIRATION RATE: 18 BRPM | TEMPERATURE: 98 F | SYSTOLIC BLOOD PRESSURE: 115 MMHG | HEART RATE: 83 BPM | WEIGHT: 155.38 LBS

## 2024-05-22 DIAGNOSIS — M25.562 ACUTE PAIN OF LEFT KNEE: ICD-10-CM

## 2024-05-22 DIAGNOSIS — M25.522 LEFT ELBOW PAIN: ICD-10-CM

## 2024-05-22 DIAGNOSIS — M25.562 ACUTE PAIN OF LEFT KNEE: Primary | ICD-10-CM

## 2024-05-22 PROCEDURE — 3078F DIAST BP <80 MM HG: CPT | Mod: CPTII,,, | Performed by: NURSE PRACTITIONER

## 2024-05-22 PROCEDURE — 73080 X-RAY EXAM OF ELBOW: CPT | Mod: TC,LT

## 2024-05-22 PROCEDURE — 1160F RVW MEDS BY RX/DR IN RCRD: CPT | Mod: CPTII,,, | Performed by: NURSE PRACTITIONER

## 2024-05-22 PROCEDURE — 3074F SYST BP LT 130 MM HG: CPT | Mod: CPTII,,, | Performed by: NURSE PRACTITIONER

## 2024-05-22 PROCEDURE — 99213 OFFICE O/P EST LOW 20 MIN: CPT | Mod: S$PBB,,, | Performed by: NURSE PRACTITIONER

## 2024-05-22 PROCEDURE — 99215 OFFICE O/P EST HI 40 MIN: CPT | Mod: PBBFAC,25 | Performed by: NURSE PRACTITIONER

## 2024-05-22 PROCEDURE — 3008F BODY MASS INDEX DOCD: CPT | Mod: CPTII,,, | Performed by: NURSE PRACTITIONER

## 2024-05-22 PROCEDURE — 1159F MED LIST DOCD IN RCRD: CPT | Mod: CPTII,,, | Performed by: NURSE PRACTITIONER

## 2024-05-22 PROCEDURE — 73562 X-RAY EXAM OF KNEE 3: CPT | Mod: TC,LT

## 2024-05-22 NOTE — PROGRESS NOTES
Patient ID: [id number]     Chief Complaint: Knee Pain (Left knee pain since 05/11/24 , denies any injury)    HPI:     Teodora Santos is a 39 y.o. female with diagnosis of Migraine. Patient seen today for knee pain. Last appointment on 03/01/2024.   Today, patient states left knee pain. States pain started on 05/11/2024 after walking around for [employer]. Denies injury, fall. Patient states pain intermittent, states she feels like her knee does swell at times. Denies swelling today. Patient states no relief with OTC pain medications.   Patient denies any other acute complaints.   Patient has history of low back pain, chronic. Patient using heat, tylenol, ibuprofen with some relief. Left Hip Xray completed 01/09/2024 and was negative. Lumbar X-ray completed and was negative. Patient referred to PT. Patient states she did not attend physical therapy because she had completed physical therapy previously. States she still has the home exercises given upon discharge from PT that she is still doing. Patient denies any sciatica pain, bowel/bladder incontinence, saddle anesthesia, bilateral sciatica.  Patient also states she is followed by Dermatology online ([facility]) and was started on creams for acne, rosacea, and perioral dermatitis. Patient would like referral to Dermatology Clinic at [facility]. Patient referred, no appointment noted. Patient referred to Dr. Wellington Bush in [place].   Patient denies any other acute complaints.     Patient followed by Neurology. Last appointment on 01/24/2024. Migraine without status migrainosus, not intractable, unspecified migraine type: continue with Ubrogepant 50 mg twice a day as needed, Tizanidine 2 mg twice a day as needed. Neuromodulator Nerivio. Advised to patient discuss with dentist regarding symptomatic severe bruxism and TMJ arthropathy. Patient has follow up appointment scheduled for 07/24/2024.     Review of patient's allergies indicates:  No Known Allergies    Breast Cancer  Screening:  deferred due to age  Cervical Cancer Screenin2022, followed by Dr. Caballero  Colorectal Cancer Screening: deferred due to age  Diabetic Eye Exam: N/A  Diabetic Foot Exam: N/A  Lung Cancer Screening: N/A  Prostate Cancer Screening: N/A  AAA Screening: N/A  Osteoporosis Screening: N/A  Medicare Wellness: N/A  Immunizations:   There is no immunization history on file for this patient.    Past Surgical History:   Procedure Laterality Date    Closure of skin laceration by suture      TONSILLECTOMY      WISDOM TOOTH EXTRACTION       family history includes Anxiety disorder in her mother and sister; Colon cancer in her paternal grandmother; Hypertension in her father; Skin cancer in her paternal grandfather.    Social History     Socioeconomic History    Marital status: Significant Other   Tobacco Use    Smoking status: Former     Types: Cigarettes    Smokeless tobacco: Never   Substance and Sexual Activity    Alcohol use: Yes     Alcohol/week: 1.0 standard drink of alcohol     Types: 1 Cans of beer per week     Comment: 3-4 nights a week  beer    Drug use: Never    Sexual activity: Yes     Partners: Male     Birth control/protection: Condom     Social Determinants of Health     Financial Resource Strain: High Risk (2024)    Overall Financial Resource Strain (CARDIA)     Difficulty of Paying Living Expenses: Hard   Food Insecurity: No Food Insecurity (2024)    Hunger Vital Sign     Worried About Running Out of Food in the Last Year: Never true     Ran Out of Food in the Last Year: Never true   Transportation Needs: No Transportation Needs (2024)    PRAPARE - Transportation     Lack of Transportation (Medical): No     Lack of Transportation (Non-Medical): No   Physical Activity: Sufficiently Active (2024)    Exercise Vital Sign     Days of Exercise per Week: 6 days     Minutes of Exercise per Session: 60 min   Stress: Stress Concern Present (2024)    [employer] - Occupational Stress Questionnaire     Feeling of Stress : Rather much   Housing Stability: High Risk (1/9/2024)    Housing Stability Vital Sign     Unable to Pay for Housing in the Last Year: Yes     Number of Places Lived in the Last Year: 1     Unstable Housing in the Last Year: No     Current Outpatient Medications   Medication Instructions    ALPRAZolam (XANAX) 0.5 MG tablet 1 tablet, Oral, As needed (PRN)    azelaic acid (AZELEX) 15 % gel Topical (Top), Combination medication with 2 other medications for perioral dematitis    digital therapeutic,INGRID device (NERIVIO DIGITAL ADEN, MIGRAINE,) Misc 1 kit, Apply Externally, Daily PRN    EScitalopram oxalate (LEXAPRO) 10 mg, Oral, Daily    EScitalopram oxalate (LEXAPRO) 2.5 mg, Oral, Daily, Takes with 10mg to equal 12.5mg daily    ibuprofen (ADVIL,MOTRIN) 200 mg, Oral, Every 6 hours PRN    ivermectin (SOOLANTRA) 1 % Crea     ketorolac (TORADOL) 10 mg, Oral, Every 6 hours PRN, Migraine cocktail to take with zofran and benadryl    metronidazole 1% (METROGEL) 1 % Gel     miscellaneous medical supply Kit 1 application , Other, Daily PRN, Nerivio device    ondansetron (ZOFRAN) 8 mg, Oral, Every 6 hours PRN, Migraine cocktail to be taken with ketorolac and benadryl    sumatriptan (IMITREX) 50 MG tablet Take 1 tablet prn once for migraine, may repeat dose in 2 hours    tretinoin (ALTRENO TOP) 1 %, Topical (Top), As needed (PRN)    UBRELVY 50 mg, Oral, As needed (PRN)    UNABLE TO FIND Transdermal, As needed (PRN), Nerivio       Subjective:     Review of Systems   Constitutional: Negative.    HENT: Negative.     Eyes: Negative.    Respiratory: Negative.     Cardiovascular: Negative.    Gastrointestinal: Negative.    Endocrine: Negative.    Genitourinary: Negative.    Musculoskeletal:  Positive for arthralgias.   Skin: Negative.    Allergic/Immunologic: Negative.    Neurological: Negative.    Hematological: Negative.    Psychiatric/Behavioral: Negative.    "      Objective:     Visit Vitals  /76 (BP Location: Right arm, Patient Position: Sitting, BP Method: Medium (Automatic))   Pulse 83   Temp 98.4 °F (36.9 °C)   Resp 18   Ht 5' 7.03" (1.703 m)   Wt 70.5 kg (155 lb 6.4 oz)   LMP 05/01/2024 (Exact Date)   BMI 24.32 kg/m²         Physical Exam  Vitals reviewed.   Constitutional:       Appearance: Normal appearance.   HENT:      Head: Normocephalic and atraumatic.      Mouth/Throat:      Mouth: Mucous membranes are moist.      Pharynx: Oropharynx is clear.   Eyes:      Extraocular Movements: Extraocular movements intact.      Conjunctiva/sclera: Conjunctivae normal.      Pupils: Pupils are equal, round, and reactive to light.   Cardiovascular:      Rate and Rhythm: Normal rate and regular rhythm.      Heart sounds: Normal heart sounds.   Pulmonary:      Effort: Pulmonary effort is normal.      Breath sounds: Normal breath sounds.   Abdominal:      General: Bowel sounds are normal.   Musculoskeletal:         General: No swelling, tenderness or deformity. Normal range of motion.      Cervical back: Normal range of motion.      Left lower leg: No edema.   Skin:     General: Skin is warm and dry.   Neurological:      Mental Status: She is alert and oriented to person, place, and time.   Psychiatric:         Mood and Affect: Mood normal.         Behavior: Behavior normal.       Labs Reviewed:     Hematology:  Lab Results   Component Value Date    WBC 8.44 02/28/2024    HGB 13.5 02/28/2024    HCT 40.4 02/28/2024     02/28/2024     Chemistry:  Lab Results   Component Value Date     02/28/2024    K 3.9 02/28/2024    CHLORIDE 105 02/28/2024    BUN 11.1 02/28/2024    CREATININE 0.84 02/28/2024    EGFRNORACEVR >60 02/28/2024    GLUCOSE 86 02/28/2024    CALCIUM 9.0 02/28/2024    ALKPHOS 41 02/28/2024    LABPROT 6.9 02/28/2024    ALBUMIN 4.1 02/28/2024    BILIDIR 0.2 04/30/2019    IBILI 0.6 04/30/2019    AST 16 02/28/2024    ALT 12 02/28/2024    MG 2.30 04/13/2022 "     Lab Results   Component Value Date    HGBA1C 5.0 10/16/2023     Lipid Panel:  Lab Results   Component Value Date    CHOL 160 10/16/2023    HDL 59 10/16/2023    LDL 90.00 10/16/2023    TRIG 55 10/16/2023    TOTALCHOLEST 3 10/16/2023     Thyroid:  Lab Results   Component Value Date    TSH 1.777 10/16/2023     Urine:  Lab Results   Component Value Date    COLORUA Yellow 10/16/2023    APPEARANCEUA Turbid (A) 10/16/2023    SGUA 1.017 10/16/2023    PHUA 7.5 10/16/2023    PROTEINUA Trace (A) 10/16/2023    GLUCOSEUA Normal 10/16/2023    KETONESUA Negative 10/16/2023    BLOODUA 2+ (A) 10/16/2023    NITRITESUA Negative 10/16/2023    LEUKOCYTESUR 500 (A) 10/16/2023    RBCUA 6-10 (A) 10/16/2023    WBCUA 51-99 (A) 10/16/2023    BACTERIA Occ (A) 10/16/2023    SQEPUA Moderate (A) 10/16/2023    HYALINECASTS None Seen 10/16/2023        Assessment:       ICD-10-CM ICD-9-CM   1. Acute pain of left knee  M25.562 719.46       Plan:     1. Acute pain of left knee  - X-Ray Knee 3 View Left; Future      Follow up if symptoms worsen or fail to improve. In addition to their scheduled follow up, the patient has also been instructed to follow up on as needed basis.     TASHI Reynoso

## 2024-06-04 ENCOUNTER — PATIENT MESSAGE (OUTPATIENT)
Dept: INTERNAL MEDICINE | Facility: CLINIC | Age: 39
End: 2024-06-04
Payer: MEDICAID

## 2024-06-10 DIAGNOSIS — G89.29 CHRONIC PAIN OF LEFT KNEE: Primary | ICD-10-CM

## 2024-06-10 DIAGNOSIS — M25.562 CHRONIC PAIN OF LEFT KNEE: Primary | ICD-10-CM

## 2024-06-18 ENCOUNTER — HOSPITAL ENCOUNTER (OUTPATIENT)
Dept: RADIOLOGY | Facility: HOSPITAL | Age: 39
Discharge: HOME | End: 2024-06-18
Attending: NURSE PRACTITIONER
Payer: MEDICAID

## 2024-06-18 ENCOUNTER — TELEPHONE (OUTPATIENT)
Dept: INTERNAL MEDICINE | Facility: CLINIC | Age: 39
End: 2024-06-18
Payer: MEDICAID

## 2024-06-18 DIAGNOSIS — M25.562 ACUTE PAIN OF LEFT KNEE: Primary | ICD-10-CM

## 2024-06-18 DIAGNOSIS — M25.562 CHRONIC PAIN OF LEFT KNEE: ICD-10-CM

## 2024-06-18 DIAGNOSIS — G89.29 CHRONIC PAIN OF LEFT KNEE: ICD-10-CM

## 2024-06-18 PROCEDURE — 73721 MRI JNT OF LWR EXTRE W/O DYE: CPT | Mod: TC,LT

## 2024-06-19 ENCOUNTER — PATIENT MESSAGE (OUTPATIENT)
Dept: INTERNAL MEDICINE | Facility: CLINIC | Age: 39
End: 2024-06-19
Payer: MEDICAID

## 2024-06-19 DIAGNOSIS — G89.29 CHRONIC PAIN OF LEFT KNEE: Primary | ICD-10-CM

## 2024-06-19 DIAGNOSIS — M25.562 CHRONIC PAIN OF LEFT KNEE: Primary | ICD-10-CM

## 2024-06-19 NOTE — TELEPHONE ENCOUNTER
Contacted informed of meniscus tear noted on MRI. Referral to Orthopedic clinic ordered. Will be notified by them once appointment is made. She voiced understanding.

## 2024-07-30 NOTE — PROGRESS NOTES
[facility] Follow Up Office Visit Note    Initial Visit Date: 10/10/2023  Last Visit Date: 1/24/2024  Current Visit Date:  07/31/2024    Chief Complaint:     Chief Complaint   Patient presents with    Migraine     States has about 12/month       History of Present Illness:      This is 39 y.o. female with history of anxiety disorder, OCD, who is referred chronic tension headache and episodic migraine without aura and severe bruxism with TMJ subluxation. Had stopped using Nerivio.     Age of Onset : childhood     Headache Description: bitemporal, throbbing, severe, impeding day to day activity, lasting 3 days, with nausea, with photophobia and phonophobia. + visual aura. Wake up with a headache.      Frequency: 9 migraine headache days per month.     Provocation Factors:  Menstruation.     Risk Factors  - Family history of headache disorder: No  - History of focal CNS lesions: No  - History of CNS infections: No  - History head trauma: No  - History of underlying mood disorder: Yes anxiety disorder, OCD. Finished graduate school and just started working.   - History of sleep disorder: Yes had not been sleeping well due to racing thoughts. Bruxism.  - Recreational drug use: No  - Tobacco use: No  - Alcohol use: Yes occasional   - Weight fluctuation: No  - Isotretinoin or Tetracycline use:  Not Applicable  - Family planning and contraceptive use: Yes IUD     Medications:     Current Prophylactic  Escitalopram 12.5 mg daily     Current Abortive  Sumatriptan 50 mg once a day as needed (2/23/2023 to present): have not tried it yet.   Ubrogepant 50 mg twice a day as needed (9/1/2023 to present): Effective. Partially effective.    Tizanidine 2 mg twice a day as needed      Prior Prophylactic  Denied      Prior Abortive  Flexeril    Devices:     - Nerivio neuromodulator: every other day and as needed. Partially effective     Procedures:     - Botox:  - PSG block:   - Occipital nerve block:     Labs:     Results for  orders placed or performed in visit on 05/22/24    PAP SMEAR   Result Value Ref Range    PAP Recommendation External No follow-up frequency specified     Pap Other Negative for intraephithelial lesion or malignancy, Other       Studies:     - MRI Brain:   - MRA Head w/o Irving:   - MRV Head w/o Irving:   - NCHCT:  - Lumbar Puncture:    Review of Systems:     Review of Systems   All other systems reviewed and are negative.      Physical Exams:     Vitals:    07/31/24 1100   BP: 112/75   Pulse: 71   Resp: 12   Temp: 98.2 °F (36.8 °C)         Physical Exam  Vitals and nursing note reviewed.   Constitutional:       Appearance: Normal appearance.   HENT:      Head: Normocephalic and atraumatic.      Comments: Left TMJ subluxation with opening. Stylomastoid angle tenderness to palpation bilateral. Masseter tenderness to palpation.      Nose: Nose normal.      Mouth/Throat:      Mouth: Mucous membranes are moist.      Pharynx: Oropharynx is clear.   Eyes:      Conjunctiva/sclera: Conjunctivae normal.   Cardiovascular:      Rate and Rhythm: Normal rate and regular rhythm.      Pulses: Normal pulses.   Pulmonary:      Effort: Pulmonary effort is normal.      Breath sounds: Normal breath sounds.   Abdominal:      General: Abdomen is flat.   Musculoskeletal:         General: Normal range of motion.      Cervical back: Normal range of motion.   Skin:     General: Skin is warm.   Neurological:      Mental Status: She is alert.         Comprehensive Neurological Exam:  Mental Status: Alert Oriented to Self, Date, and Place.  Comprehension wnl. No dysarthria.   CN II - XII: ANIL, No APD, Fundus wnl OU,VFFC, No ptosis OU, EOMI without nystagmus LT/Temp symmetric in CN V1-3 distribution, Hearing grossly intact, Face Symmetric, Tongue and Uvula midline, Trapezius symmetric bilateral.   Motor: tone and bulk wnl throughout, no abnormal involuntary or voluntary movements, 5/5 to confrontation, Fine finger movements wnl b/l, No pronator  drift.   Sensory: LT, Proprioception, Vibration, PP, Temp symmetric.  Reflexes: 2+ throughout, plantar reflexes downward bilateral.   Cerebellar: FNF wnl b/l, RAHM wnl b/l  Romberg: Negative  Gait: normal. Heel Gait, Toe Gait, Tandem Gait wnl.     Assessment:     This is 39 y.o. female with history of anxiety disorder, OCD, who is referred chronic tension headache and episodic migraine without aura and severe bruxism with TMJ subluxation. Now off of Nerivio.     Problem List Items Addressed This Visit          Neuro    Migraine without status migrainosus, not intractable    Relevant Medications    ubrogepant (UBRELVY) 50 mg tablet         Plan:     [] continue with Ubrogepant 50 mg twice a day as needed   [] continue with Tizanidine 2 mg twice a day as needed  [] restart Neuromodulator Nerivio with new insurance   [] patient willing to try GammaCore of breakthrough migraine headaches.   [] Advised to patient discuss with dentist regarding symptomatic severe bruxism and TMJ arthropathy.     RTC 6 Months in clinic    Headache education provided: good sleep hygiene and 7 hours of sleep per night, stress management, medication overuse education provided. Using more 3 OTC per week may worsen headaches, high intensity interval training has shown to reduce headache frequency. Low carb, high protein has shown to reduce headache frequency. Patient is instructed in keep headache diary.     I have explained the treatment plan, diagnosis, and prognosis to patient. All questions are answered to the best of my knowledge.     Visit today is associated with current or anticipated ongoing medical care related to this patient's single serious condition/complex condition as documented above.     Face to face time 40 minutes, including documentation, chart review, counseling, education, review of test results, relevant medical records, and coordination of care.       Teri Benoit MD   General Neurology  07/31/2024

## 2024-07-31 ENCOUNTER — OFFICE VISIT (OUTPATIENT)
Dept: NEUROLOGY | Facility: CLINIC | Age: 39
End: 2024-07-31
Payer: COMMERCIAL

## 2024-07-31 VITALS
HEIGHT: 67 IN | DIASTOLIC BLOOD PRESSURE: 75 MMHG | SYSTOLIC BLOOD PRESSURE: 112 MMHG | BODY MASS INDEX: 24.05 KG/M2 | OXYGEN SATURATION: 100 % | TEMPERATURE: 98 F | HEART RATE: 71 BPM | RESPIRATION RATE: 12 BRPM | WEIGHT: 153.25 LBS

## 2024-07-31 DIAGNOSIS — G43.909 MIGRAINE WITHOUT STATUS MIGRAINOSUS, NOT INTRACTABLE, UNSPECIFIED MIGRAINE TYPE: ICD-10-CM

## 2024-07-31 PROCEDURE — 3008F BODY MASS INDEX DOCD: CPT | Mod: CPTII,,, | Performed by: PSYCHIATRY & NEUROLOGY

## 2024-07-31 PROCEDURE — 99215 OFFICE O/P EST HI 40 MIN: CPT | Mod: S$PBB,,, | Performed by: PSYCHIATRY & NEUROLOGY

## 2024-07-31 PROCEDURE — 3078F DIAST BP <80 MM HG: CPT | Mod: CPTII,,, | Performed by: PSYCHIATRY & NEUROLOGY

## 2024-07-31 PROCEDURE — 3074F SYST BP LT 130 MM HG: CPT | Mod: CPTII,,, | Performed by: PSYCHIATRY & NEUROLOGY

## 2024-07-31 PROCEDURE — 99214 OFFICE O/P EST MOD 30 MIN: CPT | Mod: PBBFAC | Performed by: PSYCHIATRY & NEUROLOGY

## 2024-07-31 PROCEDURE — G2211 COMPLEX E/M VISIT ADD ON: HCPCS | Mod: S$PBB,,, | Performed by: PSYCHIATRY & NEUROLOGY

## 2024-07-31 PROCEDURE — 1159F MED LIST DOCD IN RCRD: CPT | Mod: CPTII,,, | Performed by: PSYCHIATRY & NEUROLOGY

## 2024-07-31 RX ORDER — DIGITAL THERAPEUTIC,REN DEVICE
1 MISCELLANEOUS MISCELLANEOUS DAILY PRN
Qty: 1 EACH | Refills: 0 | Status: SHIPPED | OUTPATIENT
Start: 2024-07-31

## 2024-07-31 RX ORDER — UBROGEPANT 50 MG/1
50 TABLET ORAL
Qty: 16 TABLET | Refills: 5 | Status: SHIPPED | OUTPATIENT
Start: 2024-07-31

## 2024-07-31 RX ORDER — MELOXICAM 15 MG/1
15 TABLET ORAL DAILY PRN
COMMUNITY
Start: 2024-07-15 | End: 2024-07-31

## 2024-08-06 ENCOUNTER — PATIENT MESSAGE (OUTPATIENT)
Dept: NEUROLOGY | Facility: CLINIC | Age: 39
End: 2024-08-06
Payer: COMMERCIAL

## 2024-11-20 ENCOUNTER — CLINICAL SUPPORT (OUTPATIENT)
Dept: URGENT CARE | Facility: CLINIC | Age: 39
End: 2024-11-20
Payer: COMMERCIAL

## 2024-11-20 VITALS — RESPIRATION RATE: 17 BRPM

## 2024-11-20 DIAGNOSIS — Z23 FLU VACCINE NEED: Primary | ICD-10-CM

## 2024-11-20 PROCEDURE — 90471 IMMUNIZATION ADMIN: CPT | Mod: ,,,

## 2024-11-20 PROCEDURE — 90656 IIV3 VACC NO PRSV 0.5 ML IM: CPT | Mod: ,,,

## 2024-11-20 NOTE — PROGRESS NOTES
Subjective:      Patient ID: Teodora Sanots is a 39 y.o. female.    Vitals:  vitals were not taken for this visit.     Chief Complaint: Flu Vaccine    Patient is a 39 y.o. female who presents to urgent care for annual flu vaccine. Flu vaccination form given, vaccine administered to the rt deltoid. Tolerated well.     ROS   Objective:     Physical Exam    Assessment:     1. Flu vaccine need        Plan:       Flu vaccine need  -     influenza (Flulaval, Fluzone, Fluarix) 45 mcg/0.5 mL IM vaccine (> or = 6 mo) 0.5 mL

## 2025-01-15 NOTE — PROGRESS NOTES
[facility]rology Follow Up Office Visit Note    Initial Visit Date: 10/10/2023  Last Visit Date: 7/31/2024  Current Visit Date:  01/17/2025    Chief Complaint:     Chief Complaint   Patient presents with    Migraine     Patient states she has 9-16 migraines a month.        History of Present Illness:      This is 39 y.o. female with history of anxiety disorder, OCD, who is referred chronic tension headache and episodic migraine without aura and severe bruxism with TMJ subluxation. During last visit, Nerivio was restarted. GammaCore was offered.     Age of Onset : childhood     Headache Description: bitemporal, throbbing, severe, impeding day to day activity, lasting 3 days, with nausea, with photophobia and phonophobia. + visual aura. Wake up with a headache.      Frequency: 9 - 16 migraine headache days per month.     Provocation Factors:  Menstruation.     Risk Factors  - Family history of headache disorder: No  - History of focal CNS lesions: No  - History of CNS infections: No  - History head trauma: No  - History of underlying mood disorder: Yes anxiety disorder, OCD. Finished graduate school and just started working.   - History of sleep disorder: Yes had not been sleeping well due to racing thoughts. Bruxism.  - Recreational drug use: No  - Tobacco use: No  - Alcohol use: Yes occasional   - Weight fluctuation: No  - Isotretinoin or Tetracycline use:  Not Applicable  - Family planning and contraceptive use: Yes IUD     Medications:     Current Prophylactic  Escitalopram 12.5 mg daily     Current Abortive  Sumatriptan 50 mg once a day as needed (2/23/2023 to present): have not tried it yet.   Ubrogepant 50 mg twice a day as needed (9/1/2023 to present): Effective. Partially effective.    Tizanidine 2 mg twice a day as needed      Prior Prophylactic  Denied      Prior Abortive  Flexeril    Devices:     - Nerivio neuromodulator: every other day and as needed. Partially effective   - GammaCore eVNS:   ineffective    Procedures:     - Botox:  - PSG block:   - Occipital nerve block:     Labs:     Results for orders placed or performed in visit on 05/22/24    PAP SMEAR    Collection Time: 08/16/21 12:00 AM   Result Value Ref Range    PAP Recommendation External No follow-up frequency specified     Pap Other Negative for intraephithelial lesion or malignancy, Other       Studies:     - MRI Brain:   - MRA Head w/o Irving:   - MRV Head w/o Irving:   - NCHCT:  - Lumbar Puncture:    Review of Systems:     Review of Systems   All other systems reviewed and are negative.      Physical Exams:     Vitals:    01/17/25 0951   BP: 119/76   Pulse: 85   Resp: 18   Temp: 98.8 °F (37.1 °C)           Physical Exam  Vitals and nursing note reviewed.   Constitutional:       Appearance: Normal appearance.   HENT:      Head: Normocephalic and atraumatic.      Comments: Left TMJ subluxation with opening. Stylomastoid angle tenderness to palpation bilateral. Masseter tenderness to palpation.      Nose: Nose normal.      Mouth/Throat:      Mouth: Mucous membranes are moist.      Pharynx: Oropharynx is clear.   Eyes:      Conjunctiva/sclera: Conjunctivae normal.   Cardiovascular:      Rate and Rhythm: Normal rate and regular rhythm.      Pulses: Normal pulses.   Pulmonary:      Effort: Pulmonary effort is normal.      Breath sounds: Normal breath sounds.   Abdominal:      General: Abdomen is flat.   Musculoskeletal:         General: Normal range of motion.      Cervical back: Normal range of motion.   Skin:     General: Skin is warm.   Neurological:      Mental Status: She is alert.       Comprehensive Neurological Exam:  Mental Status: Alert Oriented to Self, Date, and Place.  Comprehension wnl. No dysarthria.   CN II - XII: ANIL, No APD, Fundus wnl OU,VFFC, No ptosis OU, EOMI without nystagmus LT/Temp symmetric in CN V1-3 distribution, Hearing grossly intact, Face Symmetric, Tongue and Uvula midline, Trapezius symmetric bilateral.   Motor:  tone and bulk wnl throughout, no abnormal involuntary or voluntary movements, 5/5 to confrontation, Fine finger movements wnl b/l, No pronator drift.   Sensory: LT, Proprioception, Vibration, PP, Temp symmetric.  Reflexes: 2+ throughout, plantar reflexes downward bilateral.   Cerebellar: FNF wnl b/l, RAHM wnl b/l  Romberg: Negative  Gait: normal. Heel Gait, Toe Gait, Tandem Gait wnl.     Assessment:     This is 39 y.o. female with history of anxiety disorder, OCD, who is referred for episodic migraine without aura and severe bruxism with TMJ subluxation. Now off of Nerivio.     Problem List Items Addressed This Visit          Neuro    Migraine without status migrainosus, not intractable - Primary    Relevant Medications    atogepant (QULIPTA) 10 mg Tab           Plan:     [] continue with Ubrogepant 50 mg twice a day as needed   [] continue with Tizanidine 2 mg twice a day as needed  [] start Atogepant 10 mg daily     RTC 6 Months in clinic    Headache education provided: good sleep hygiene and 7 hours of sleep per night, stress management, medication overuse education provided. Using more 3 OTC per week may worsen headaches, high intensity interval training has shown to reduce headache frequency. Low carb, high protein has shown to reduce headache frequency. Patient is instructed in keep headache diary.     I have explained the treatment plan, diagnosis, and prognosis to patient. All questions are answered to the best of my knowledge.     Visit today is associated with current or anticipated ongoing medical care related to this patient's single serious condition/complex condition as documented above.     Face to face time 30 minutes, including documentation, chart review, counseling, education, review of test results, relevant medical records, and coordination of care.       Teri Benoit MD   General Neurology  01/17/2025

## 2025-01-17 ENCOUNTER — OFFICE VISIT (OUTPATIENT)
Dept: NEUROLOGY | Facility: CLINIC | Age: 40
End: 2025-01-17
Payer: COMMERCIAL

## 2025-01-17 VITALS
WEIGHT: 158.38 LBS | SYSTOLIC BLOOD PRESSURE: 119 MMHG | HEART RATE: 85 BPM | OXYGEN SATURATION: 100 % | DIASTOLIC BLOOD PRESSURE: 76 MMHG | HEIGHT: 67 IN | BODY MASS INDEX: 24.86 KG/M2 | RESPIRATION RATE: 18 BRPM | TEMPERATURE: 99 F

## 2025-01-17 DIAGNOSIS — G43.909 MIGRAINE WITHOUT STATUS MIGRAINOSUS, NOT INTRACTABLE, UNSPECIFIED MIGRAINE TYPE: Primary | ICD-10-CM

## 2025-01-17 PROCEDURE — 99214 OFFICE O/P EST MOD 30 MIN: CPT | Mod: PBBFAC | Performed by: PSYCHIATRY & NEUROLOGY

## 2025-01-17 PROCEDURE — 1159F MED LIST DOCD IN RCRD: CPT | Mod: CPTII,,, | Performed by: PSYCHIATRY & NEUROLOGY

## 2025-01-17 PROCEDURE — 3078F DIAST BP <80 MM HG: CPT | Mod: CPTII,,, | Performed by: PSYCHIATRY & NEUROLOGY

## 2025-01-17 PROCEDURE — 3074F SYST BP LT 130 MM HG: CPT | Mod: CPTII,,, | Performed by: PSYCHIATRY & NEUROLOGY

## 2025-01-17 PROCEDURE — 3008F BODY MASS INDEX DOCD: CPT | Mod: CPTII,,, | Performed by: PSYCHIATRY & NEUROLOGY

## 2025-01-17 PROCEDURE — G2211 COMPLEX E/M VISIT ADD ON: HCPCS | Mod: S$PBB,,, | Performed by: PSYCHIATRY & NEUROLOGY

## 2025-01-17 PROCEDURE — 99214 OFFICE O/P EST MOD 30 MIN: CPT | Mod: S$PBB,,, | Performed by: PSYCHIATRY & NEUROLOGY

## 2025-01-17 RX ORDER — ATOGEPANT 10 MG/1
10 TABLET ORAL DAILY
Qty: 30 TABLET | Refills: 3 | Status: SHIPPED | OUTPATIENT
Start: 2025-01-17 | End: 2025-05-17

## 2025-01-24 ENCOUNTER — PATIENT MESSAGE (OUTPATIENT)
Dept: NEUROLOGY | Facility: CLINIC | Age: 40
End: 2025-01-24
Payer: COMMERCIAL

## 2025-05-28 ENCOUNTER — PATIENT MESSAGE (OUTPATIENT)
Dept: NEUROLOGY | Facility: CLINIC | Age: 40
End: 2025-05-28
Payer: COMMERCIAL

## 2025-05-29 DIAGNOSIS — G43.909 MIGRAINE WITHOUT STATUS MIGRAINOSUS, NOT INTRACTABLE, UNSPECIFIED MIGRAINE TYPE: Primary | ICD-10-CM

## 2025-05-29 RX ORDER — MEMANTINE HYDROCHLORIDE 5 MG/1
5 TABLET ORAL DAILY
Qty: 30 TABLET | Refills: 2 | Status: SHIPPED | OUTPATIENT
Start: 2025-05-29 | End: 2026-05-29

## 2025-06-26 ENCOUNTER — PATIENT MESSAGE (OUTPATIENT)
Dept: NEUROLOGY | Facility: CLINIC | Age: 40
End: 2025-06-26
Payer: COMMERCIAL

## 2025-06-26 DIAGNOSIS — G43.909 MIGRAINE WITHOUT STATUS MIGRAINOSUS, NOT INTRACTABLE, UNSPECIFIED MIGRAINE TYPE: ICD-10-CM

## 2025-06-27 RX ORDER — TIZANIDINE 2 MG/1
2 TABLET ORAL 2 TIMES DAILY
Qty: 10 TABLET | Refills: 0 | Status: SHIPPED | OUTPATIENT
Start: 2025-06-27 | End: 2025-07-02

## 2025-06-27 RX ORDER — MELOXICAM 15 MG/1
15 TABLET ORAL DAILY
Qty: 5 TABLET | Refills: 0 | Status: SHIPPED | OUTPATIENT
Start: 2025-06-27 | End: 2025-07-02

## 2025-06-27 RX ORDER — ONDANSETRON 8 MG/1
8 TABLET, FILM COATED ORAL 2 TIMES DAILY
Qty: 10 TABLET | Refills: 0 | Status: SHIPPED | OUTPATIENT
Start: 2025-06-27 | End: 2025-07-02

## 2025-06-27 RX ORDER — DIVALPROEX SODIUM 250 MG/1
250 TABLET, FILM COATED, EXTENDED RELEASE ORAL EVERY 12 HOURS
Qty: 10 TABLET | Refills: 0 | Status: SHIPPED | OUTPATIENT
Start: 2025-06-27 | End: 2025-07-02

## 2025-07-23 NOTE — PROGRESS NOTES
[facility] Neurology Follow Up Office Visit Note    Initial Visit Date: 10/10/2023  Last Visit Date: 1/17/2025  Current Visit Date:  07/25/2025    Chief Complaint:     Chief Complaint   Patient presents with    Migraine     Patient states that migraines are the same  Has concerns about Ubrelvy     restless leg       History of Present Illness:      This is 40 y.o. female with history of anxiety disorder, OCD, who is referred for episodic migraine without aura and severe bruxism with TMJ subluxation. During last visit, Atogepant 10 mg daily was started. Patient did not start prescription. We had offered to switch her to memantine 5 mg daily for preventative. Has not taken any of her medications. Also feeling restless at bedtime, especially around time of menses.     Age of Onset : childhood     Headache Description: bitemporal, throbbing, severe, impeding day to day activity, lasting 3 days, with nausea, with photophobia and phonophobia. + visual aura. Wake up with a headache.      Frequency: 20 migraine headache days per month.     Provocation Factors:  Menstruation.     Risk Factors  - Family history of headache disorder: No  - History of focal CNS lesions: No  - History of CNS infections: No  - History head trauma: No  - History of underlying mood disorder: Yes anxiety disorder, OCD. Finished graduate school and just started working.   - History of sleep disorder: Yes had not been sleeping well due to racing thoughts. Bruxism.  - Recreational drug use: No  - Tobacco use: No  - Alcohol use: Yes occasional   - Weight fluctuation: No  - Isotretinoin or Tetracycline use:  Not Applicable  - Family planning and contraceptive use: Yes IUD  Medications:     Current Prophylactic  Escitalopram 12.5 mg daily  Memantine 5 mg daily (5/29/2025 to present): not tried yet     Current Abortive  Sumatriptan 50 mg once a day as needed (2/23/2023 to present): have not tried it yet.   Ubrogepant 50 mg twice a day as needed (9/1/2023 to  present): Effective. Partially effective.    Tizanidine 2 mg twice a day as needed      Prior Prophylactic  Atogepant 60 mg daily (1/17/2025)      Prior Abortive  Flexeril    Devices:     - Nerivio neuromodulator: every other day and as needed. Partially effective   - GammaCore eVNS:  ineffective    Procedures:     - Botox:  - PSG block:   - Occipital nerve block:     Labs:     Results for orders placed or performed in visit on 05/22/24    PAP SMEAR    Collection Time: 08/16/21 12:00 AM   Result Value Ref Range    PAP Recommendation External No follow-up frequency specified     Pap Other Negative for intraephithelial lesion or malignancy, Other       Studies:     - MRI Brain:   - MRA Head w/o Irving:   - MRV Head w/o Irving:   - NCHCT:  - Lumbar Puncture:    Review of Systems:     Review of Systems   All other systems reviewed and are negative.      Physical Exams:     Vitals:    07/25/25 0952   BP: 112/77   Pulse: 85   Resp: 18   Temp: 97.9 °F (36.6 °C)           Physical Exam  Vitals and nursing note reviewed.   Constitutional:       Appearance: Normal appearance.   HENT:      Head: Normocephalic and atraumatic.      Comments: Left TMJ subluxation with opening. Stylomastoid angle tenderness to palpation bilateral. Masseter tenderness to palpation.      Nose: Nose normal.      Mouth/Throat:      Mouth: Mucous membranes are moist.      Pharynx: Oropharynx is clear.   Eyes:      Conjunctiva/sclera: Conjunctivae normal.   Cardiovascular:      Rate and Rhythm: Normal rate and regular rhythm.      Pulses: Normal pulses.   Pulmonary:      Effort: Pulmonary effort is normal.      Breath sounds: Normal breath sounds.   Abdominal:      General: Abdomen is flat.   Musculoskeletal:         General: Normal range of motion.      Cervical back: Normal range of motion.   Skin:     General: Skin is warm.   Neurological:      Mental Status: She is alert.         Comprehensive Neurological Exam:  Mental Status: Alert Oriented to Self,  Date, and Place.  Comprehension wnl. No dysarthria.   CN II - XII: ANIL, No APD, Fundus wnl OU,VFFC, No ptosis OU, EOMI without nystagmus LT/Temp symmetric in CN V1-3 distribution, Hearing grossly intact, Face Symmetric, Tongue and Uvula midline, Trapezius symmetric bilateral.   Motor: tone and bulk wnl throughout, no abnormal involuntary or voluntary movements, 5/5 to confrontation, Fine finger movements wnl b/l, No pronator drift.   Sensory: LT, Proprioception, Vibration, PP, Temp symmetric.  Reflexes: 2+ throughout, plantar reflexes downward bilateral.   Cerebellar: FNF wnl b/l, RAHM wnl b/l  Romberg: Negative  Gait: normal. Heel Gait, Toe Gait, Tandem Gait wnl.     Assessment:     This is 40 y.o. female with history of anxiety disorder, OCD, who is referred for chronic migraine migraine without aura and severe bruxism with TMJ subluxation. Will to go back to [facility]. Also has RLS.     1. Restless leg syndrome  -     CBC auto differential; Future; Expected date: 07/25/2025  -     Comprehensive metabolic panel; Future; Expected date: 07/25/2025  -     Iron and TIBC; Future; Expected date: 07/25/2025    2. Chronic migraine without aura without status migrainosus, not intractable  -     sumatriptan (IMITREX) 50 MG tablet; Take 1 tablet prn once for migraine, may repeat dose in 2 hours  Dispense: 9 tablet; Refill: 5  -     ubrogepant (UBRELVY) 50 mg tablet; Take 1 tablet (50 mg total) by mouth as needed for Migraine (can repeat dose in 2 hours if needed, do not exceed 2 doses in 24 hours).  Dispense: 16 tablet; Refill: 5  -     propranoloL (INDERAL) 10 MG tablet; Take 1 tablet (10 mg total) by mouth every evening.  Dispense: 30 tablet; Refill: 5          Plan:     [] start Sumatriptan 50 mg twice a day as needed  [] start Propranolol 10 mg at bedtime  [] continue with Ubrogepant 50 mg twice a day as needed   [] continue with Tizanidine 2 mg twice a day as needed  [] CBC, CMP, Iron level/TIBC for RLS  [] restart  Nerivio every other day and as needed at onset of migraine headaches    RTC 6 Months in clinic    Headache education provided: good sleep hygiene and 7 hours of sleep per night, stress management, medication overuse education provided. Using more 3 OTC per week may worsen headaches, high intensity interval training has shown to reduce headache frequency. Low carb, high protein has shown to reduce headache frequency. Patient is instructed in keep headache diary.     I have explained the treatment plan, diagnosis, and prognosis to patient. All questions are answered to the best of my knowledge.     Visit today is associated with current or anticipated ongoing medical care related to this patient's single serious condition/complex condition as documented above.     Face to face time 30 minutes, including documentation, chart review, counseling, education, review of test results, relevant medical records, and coordination of care.       Teri Benoit MD   General Neurology  07/25/2025

## 2025-07-25 ENCOUNTER — OFFICE VISIT (OUTPATIENT)
Dept: NEUROLOGY | Facility: CLINIC | Age: 40
End: 2025-07-25
Payer: COMMERCIAL

## 2025-07-25 VITALS
RESPIRATION RATE: 18 BRPM | SYSTOLIC BLOOD PRESSURE: 112 MMHG | HEIGHT: 67 IN | BODY MASS INDEX: 25.9 KG/M2 | HEART RATE: 85 BPM | WEIGHT: 165 LBS | TEMPERATURE: 98 F | OXYGEN SATURATION: 100 % | DIASTOLIC BLOOD PRESSURE: 77 MMHG

## 2025-07-25 DIAGNOSIS — G43.709 CHRONIC MIGRAINE WITHOUT AURA WITHOUT STATUS MIGRAINOSUS, NOT INTRACTABLE: Primary | ICD-10-CM

## 2025-07-25 DIAGNOSIS — G25.81 RESTLESS LEG SYNDROME: ICD-10-CM

## 2025-07-25 PROCEDURE — 99214 OFFICE O/P EST MOD 30 MIN: CPT | Mod: PBBFAC | Performed by: PSYCHIATRY & NEUROLOGY

## 2025-07-25 RX ORDER — HYDROCORTISONE 25 MG/G
OINTMENT TOPICAL 2 TIMES DAILY
COMMUNITY
Start: 2025-04-24

## 2025-07-25 RX ORDER — PROPRANOLOL HYDROCHLORIDE 10 MG/1
10 TABLET ORAL NIGHTLY
Qty: 30 TABLET | Refills: 5 | Status: SHIPPED | OUTPATIENT
Start: 2025-07-25 | End: 2026-01-21

## 2025-07-25 RX ORDER — DIGITAL THERAPEUTIC,REN DEVICE
1 MISCELLANEOUS MISCELLANEOUS EVERY OTHER DAY
Qty: 1 EACH | Refills: 16 | Status: SHIPPED | OUTPATIENT
Start: 2025-07-25 | End: 2025-10-23

## 2025-07-25 RX ORDER — BUSPIRONE HYDROCHLORIDE 10 MG/1
10 TABLET ORAL 2 TIMES DAILY
COMMUNITY
Start: 2025-04-01

## 2025-07-25 RX ORDER — SUMATRIPTAN SUCCINATE 50 MG/1
TABLET ORAL
Qty: 9 TABLET | Refills: 5 | Status: SHIPPED | OUTPATIENT
Start: 2025-07-25

## 2025-07-30 ENCOUNTER — TELEPHONE (OUTPATIENT)
Dept: NEUROLOGY | Facility: CLINIC | Age: 40
End: 2025-07-30
Payer: COMMERCIAL

## 2025-07-30 NOTE — TELEPHONE ENCOUNTER
Returned call spoke with Luis, waited on hold for Dahiana for 5 minutes. Call disconnected. Called office back and stated Dahiana would call back.

## 2025-07-30 NOTE — TELEPHONE ENCOUNTER
----- Message from Daisha sent at 7/30/2025  2:43 PM CDT -----  Regarding: Lilian Talbot with [facility] pharmacy left voicemail 7/30 @ 1:40 needing to speak to someone regarding script for NERIVIO DIGITAL  device . Please contact [phone]

## 2025-08-01 ENCOUNTER — PATIENT MESSAGE (OUTPATIENT)
Dept: NEUROLOGY | Facility: CLINIC | Age: 40
End: 2025-08-01
Payer: COMMERCIAL

## 2025-08-28 ENCOUNTER — PATIENT MESSAGE (OUTPATIENT)
Dept: NEUROLOGY | Facility: CLINIC | Age: 40
End: 2025-08-28
Payer: COMMERCIAL